# Patient Record
Sex: FEMALE | Race: BLACK OR AFRICAN AMERICAN | NOT HISPANIC OR LATINO
[De-identification: names, ages, dates, MRNs, and addresses within clinical notes are randomized per-mention and may not be internally consistent; named-entity substitution may affect disease eponyms.]

---

## 2019-12-03 ENCOUNTER — RESULT REVIEW (OUTPATIENT)
Age: 59
End: 2019-12-03

## 2023-10-25 ENCOUNTER — NON-APPOINTMENT (OUTPATIENT)
Age: 63
End: 2023-10-25

## 2023-10-31 ENCOUNTER — APPOINTMENT (OUTPATIENT)
Dept: THORACIC SURGERY | Facility: CLINIC | Age: 63
End: 2023-10-31
Payer: COMMERCIAL

## 2023-10-31 VITALS
BODY MASS INDEX: 33.82 KG/M2 | RESPIRATION RATE: 14 BRPM | OXYGEN SATURATION: 100 % | DIASTOLIC BLOOD PRESSURE: 79 MMHG | HEART RATE: 79 BPM | SYSTOLIC BLOOD PRESSURE: 153 MMHG | WEIGHT: 203 LBS | HEIGHT: 65 IN

## 2023-10-31 DIAGNOSIS — F17.200 NICOTINE DEPENDENCE, UNSPECIFIED, UNCOMPLICATED: ICD-10-CM

## 2023-10-31 DIAGNOSIS — Z86.39 PERSONAL HISTORY OF OTHER ENDOCRINE, NUTRITIONAL AND METABOLIC DISEASE: ICD-10-CM

## 2023-10-31 DIAGNOSIS — Z78.9 OTHER SPECIFIED HEALTH STATUS: ICD-10-CM

## 2023-10-31 DIAGNOSIS — Z80.7 FAMILY HISTORY OF OTHER MALIGNANT NEOPLASMS OF LYMPHOID, HEMATOPOIETIC AND RELATED TISSUES: ICD-10-CM

## 2023-10-31 DIAGNOSIS — Z20.1 CONTACT WITH AND (SUSPECTED) EXPOSURE TO TUBERCULOSIS: ICD-10-CM

## 2023-10-31 DIAGNOSIS — Z86.79 PERSONAL HISTORY OF OTHER DISEASES OF THE CIRCULATORY SYSTEM: ICD-10-CM

## 2023-10-31 DIAGNOSIS — D72.9 DISORDER OF WHITE BLOOD CELLS, UNSPECIFIED: ICD-10-CM

## 2023-10-31 DIAGNOSIS — Z83.511 FAMILY HISTORY OF GLAUCOMA: ICD-10-CM

## 2023-10-31 DIAGNOSIS — Z83.438 FAMILY HISTORY OF OTHER DISORDER OF LIPOPROTEIN METABOLISM AND OTHER LIPIDEMIA: ICD-10-CM

## 2023-10-31 DIAGNOSIS — Z82.49 FAMILY HISTORY OF ISCHEMIC HEART DISEASE AND OTHER DISEASES OF THE CIRCULATORY SYSTEM: ICD-10-CM

## 2023-10-31 PROCEDURE — 99205 OFFICE O/P NEW HI 60 MIN: CPT

## 2023-10-31 RX ORDER — ATORVASTATIN CALCIUM 20 MG/1
20 TABLET, FILM COATED ORAL
Refills: 0 | Status: ACTIVE | COMMUNITY

## 2023-10-31 RX ORDER — ASPIRIN 81 MG
81 TABLET, DELAYED RELEASE (ENTERIC COATED) ORAL
Refills: 0 | Status: ACTIVE | COMMUNITY

## 2023-10-31 RX ORDER — TRIAMTERENE AND HYDROCHLOROTHIAZIDE 37.5; 25 MG/1; MG/1
37.5-25 CAPSULE ORAL
Refills: 0 | Status: ACTIVE | COMMUNITY

## 2023-11-01 ENCOUNTER — NON-APPOINTMENT (OUTPATIENT)
Age: 63
End: 2023-11-01

## 2023-11-21 ENCOUNTER — OUTPATIENT (OUTPATIENT)
Dept: OUTPATIENT SERVICES | Facility: HOSPITAL | Age: 63
LOS: 1 days | End: 2023-11-21
Payer: COMMERCIAL

## 2023-11-21 ENCOUNTER — APPOINTMENT (OUTPATIENT)
Dept: PULMONOLOGY | Facility: CLINIC | Age: 63
End: 2023-11-21
Payer: COMMERCIAL

## 2023-11-21 VITALS
HEART RATE: 65 BPM | SYSTOLIC BLOOD PRESSURE: 146 MMHG | DIASTOLIC BLOOD PRESSURE: 83 MMHG | TEMPERATURE: 97 F | WEIGHT: 199.96 LBS | HEIGHT: 63.5 IN | OXYGEN SATURATION: 98 % | RESPIRATION RATE: 18 BRPM

## 2023-11-21 DIAGNOSIS — Z98.890 OTHER SPECIFIED POSTPROCEDURAL STATES: Chronic | ICD-10-CM

## 2023-11-21 DIAGNOSIS — R91.8 OTHER NONSPECIFIC ABNORMAL FINDING OF LUNG FIELD: ICD-10-CM

## 2023-11-21 DIAGNOSIS — I10 ESSENTIAL (PRIMARY) HYPERTENSION: ICD-10-CM

## 2023-11-21 DIAGNOSIS — Z91.89 OTHER SPECIFIED PERSONAL RISK FACTORS, NOT ELSEWHERE CLASSIFIED: ICD-10-CM

## 2023-11-21 DIAGNOSIS — Z98.891 HISTORY OF UTERINE SCAR FROM PREVIOUS SURGERY: Chronic | ICD-10-CM

## 2023-11-21 LAB
ALBUMIN SERPL ELPH-MCNC: 4.3 G/DL — SIGNIFICANT CHANGE UP (ref 3.3–5)
ALBUMIN SERPL ELPH-MCNC: 4.3 G/DL — SIGNIFICANT CHANGE UP (ref 3.3–5)
ALP SERPL-CCNC: 87 U/L — SIGNIFICANT CHANGE UP (ref 40–120)
ALP SERPL-CCNC: 87 U/L — SIGNIFICANT CHANGE UP (ref 40–120)
ALT FLD-CCNC: 8 U/L — SIGNIFICANT CHANGE UP (ref 4–33)
ALT FLD-CCNC: 8 U/L — SIGNIFICANT CHANGE UP (ref 4–33)
ANION GAP SERPL CALC-SCNC: 11 MMOL/L — SIGNIFICANT CHANGE UP (ref 7–14)
ANION GAP SERPL CALC-SCNC: 11 MMOL/L — SIGNIFICANT CHANGE UP (ref 7–14)
AST SERPL-CCNC: 7 U/L — SIGNIFICANT CHANGE UP (ref 4–32)
AST SERPL-CCNC: 7 U/L — SIGNIFICANT CHANGE UP (ref 4–32)
BILIRUB SERPL-MCNC: 0.4 MG/DL — SIGNIFICANT CHANGE UP (ref 0.2–1.2)
BILIRUB SERPL-MCNC: 0.4 MG/DL — SIGNIFICANT CHANGE UP (ref 0.2–1.2)
BLD GP AB SCN SERPL QL: NEGATIVE — SIGNIFICANT CHANGE UP
BLD GP AB SCN SERPL QL: NEGATIVE — SIGNIFICANT CHANGE UP
BUN SERPL-MCNC: 12 MG/DL — SIGNIFICANT CHANGE UP (ref 7–23)
BUN SERPL-MCNC: 12 MG/DL — SIGNIFICANT CHANGE UP (ref 7–23)
CALCIUM SERPL-MCNC: 9.6 MG/DL — SIGNIFICANT CHANGE UP (ref 8.4–10.5)
CALCIUM SERPL-MCNC: 9.6 MG/DL — SIGNIFICANT CHANGE UP (ref 8.4–10.5)
CHLORIDE SERPL-SCNC: 104 MMOL/L — SIGNIFICANT CHANGE UP (ref 98–107)
CHLORIDE SERPL-SCNC: 104 MMOL/L — SIGNIFICANT CHANGE UP (ref 98–107)
CO2 SERPL-SCNC: 29 MMOL/L — SIGNIFICANT CHANGE UP (ref 22–31)
CO2 SERPL-SCNC: 29 MMOL/L — SIGNIFICANT CHANGE UP (ref 22–31)
CREAT SERPL-MCNC: 0.84 MG/DL — SIGNIFICANT CHANGE UP (ref 0.5–1.3)
CREAT SERPL-MCNC: 0.84 MG/DL — SIGNIFICANT CHANGE UP (ref 0.5–1.3)
EGFR: 79 ML/MIN/1.73M2 — SIGNIFICANT CHANGE UP
EGFR: 79 ML/MIN/1.73M2 — SIGNIFICANT CHANGE UP
GLUCOSE SERPL-MCNC: 85 MG/DL — SIGNIFICANT CHANGE UP (ref 70–99)
GLUCOSE SERPL-MCNC: 85 MG/DL — SIGNIFICANT CHANGE UP (ref 70–99)
HCT VFR BLD CALC: 40.2 % — SIGNIFICANT CHANGE UP (ref 34.5–45)
HCT VFR BLD CALC: 40.2 % — SIGNIFICANT CHANGE UP (ref 34.5–45)
HGB BLD-MCNC: 13.5 G/DL — SIGNIFICANT CHANGE UP (ref 11.5–15.5)
HGB BLD-MCNC: 13.5 G/DL — SIGNIFICANT CHANGE UP (ref 11.5–15.5)
MCHC RBC-ENTMCNC: 30.3 PG — SIGNIFICANT CHANGE UP (ref 27–34)
MCHC RBC-ENTMCNC: 30.3 PG — SIGNIFICANT CHANGE UP (ref 27–34)
MCHC RBC-ENTMCNC: 33.6 GM/DL — SIGNIFICANT CHANGE UP (ref 32–36)
MCHC RBC-ENTMCNC: 33.6 GM/DL — SIGNIFICANT CHANGE UP (ref 32–36)
MCV RBC AUTO: 90.1 FL — SIGNIFICANT CHANGE UP (ref 80–100)
MCV RBC AUTO: 90.1 FL — SIGNIFICANT CHANGE UP (ref 80–100)
NRBC # BLD: 0 /100 WBCS — SIGNIFICANT CHANGE UP (ref 0–0)
NRBC # BLD: 0 /100 WBCS — SIGNIFICANT CHANGE UP (ref 0–0)
NRBC # FLD: 0 K/UL — SIGNIFICANT CHANGE UP (ref 0–0)
NRBC # FLD: 0 K/UL — SIGNIFICANT CHANGE UP (ref 0–0)
PLATELET # BLD AUTO: 257 K/UL — SIGNIFICANT CHANGE UP (ref 150–400)
PLATELET # BLD AUTO: 257 K/UL — SIGNIFICANT CHANGE UP (ref 150–400)
POTASSIUM SERPL-MCNC: 3.7 MMOL/L — SIGNIFICANT CHANGE UP (ref 3.5–5.3)
POTASSIUM SERPL-MCNC: 3.7 MMOL/L — SIGNIFICANT CHANGE UP (ref 3.5–5.3)
POTASSIUM SERPL-SCNC: 3.7 MMOL/L — SIGNIFICANT CHANGE UP (ref 3.5–5.3)
POTASSIUM SERPL-SCNC: 3.7 MMOL/L — SIGNIFICANT CHANGE UP (ref 3.5–5.3)
PROT SERPL-MCNC: 7 G/DL — SIGNIFICANT CHANGE UP (ref 6–8.3)
PROT SERPL-MCNC: 7 G/DL — SIGNIFICANT CHANGE UP (ref 6–8.3)
RBC # BLD: 4.46 M/UL — SIGNIFICANT CHANGE UP (ref 3.8–5.2)
RBC # BLD: 4.46 M/UL — SIGNIFICANT CHANGE UP (ref 3.8–5.2)
RBC # FLD: 12.6 % — SIGNIFICANT CHANGE UP (ref 10.3–14.5)
RBC # FLD: 12.6 % — SIGNIFICANT CHANGE UP (ref 10.3–14.5)
RH IG SCN BLD-IMP: POSITIVE — SIGNIFICANT CHANGE UP
SODIUM SERPL-SCNC: 144 MMOL/L — SIGNIFICANT CHANGE UP (ref 135–145)
SODIUM SERPL-SCNC: 144 MMOL/L — SIGNIFICANT CHANGE UP (ref 135–145)
WBC # BLD: 10.37 K/UL — SIGNIFICANT CHANGE UP (ref 3.8–10.5)
WBC # BLD: 10.37 K/UL — SIGNIFICANT CHANGE UP (ref 3.8–10.5)
WBC # FLD AUTO: 10.37 K/UL — SIGNIFICANT CHANGE UP (ref 3.8–10.5)
WBC # FLD AUTO: 10.37 K/UL — SIGNIFICANT CHANGE UP (ref 3.8–10.5)

## 2023-11-21 PROCEDURE — 93010 ELECTROCARDIOGRAM REPORT: CPT

## 2023-11-21 PROCEDURE — 94726 PLETHYSMOGRAPHY LUNG VOLUMES: CPT

## 2023-11-21 PROCEDURE — 94010 BREATHING CAPACITY TEST: CPT

## 2023-11-21 PROCEDURE — 94729 DIFFUSING CAPACITY: CPT

## 2023-11-21 RX ORDER — SODIUM CHLORIDE 9 MG/ML
1000 INJECTION, SOLUTION INTRAVENOUS
Refills: 0 | Status: DISCONTINUED | OUTPATIENT
Start: 2023-11-29 | End: 2023-11-30

## 2023-11-21 NOTE — H&P PST ADULT - HISTORY OF PRESENT ILLNESS
62 y/o F with H/O: HTN, HLD, Obesity, smokers x 40 years  eval by hematologist due to elevated WBC. S/P CT scan 2022 which showed a nodule in the left lung. F/U tearl, S/ P CT scan in 2023 showed that the nodule got bigger. Pt was then referred to surgeon for further consultation  62 y/o F with H/O: HTN, HLD, Obesity, smokers x 40 years presents for pre op evaluation stated that she was evaluated by hematologist due to elevated WBC. S/P CT scan 2022 which showed "a nodule in the left lung". F/U yearly, S/P CT scan in 2023 showed that the nodule got bigger. Pt was then referred to surgeon for further consultation. Now schedule for left video assisted thoracoscopy, robotic assisted, left lower lobe segmentectomy, possible lobectomy, mediastinal lymph node dissection tentatively on 11/29/23

## 2023-11-21 NOTE — H&P PST ADULT - NSICDXPASTSURGICALHX_GEN_ALL_CORE_FT
PAST SURGICAL HISTORY:  H/O ventral hernia repair     History of 2  sections     Status post colonoscopy

## 2023-11-21 NOTE — H&P PST ADULT - NSICDXPASTMEDICALHX_GEN_ALL_CORE_FT
PAST MEDICAL HISTORY:  HLD (hyperlipidemia)     HTN (hypertension)      PAST MEDICAL HISTORY:  HLD (hyperlipidemia)     HTN (hypertension)     Obesity     Other nonspecific abnormal finding of lung field

## 2023-11-21 NOTE — H&P PST ADULT - RESPIRATORY
clear to auscultation bilaterally/no wheezes/no rhonchi/no respiratory distress/no use of accessory muscles/airway patent/breath sounds equal/good air movement/respirations non-labored

## 2023-11-21 NOTE — H&P PST ADULT - NEGATIVE ENMT SYMPTOMS
no hearing difficulty/no vertigo/no sinus symptoms/no post-nasal discharge/no nose bleeds/no dry mouth/no throat pain

## 2023-11-21 NOTE — H&P PST ADULT - NEUROLOGICAL
cranial nerves II-XII intact/sensation intact/responds to pain negative cranial nerves II-XII intact/sensation intact/responds to pain/responds to verbal commands

## 2023-11-21 NOTE — H&P PST ADULT - PROBLEM SELECTOR PLAN 1
Schedule for left video assisted thoracoscopy, robotic assisted, left lower lobe segmentectomy, possible lobectomy, mediastinal lymph node dissection tentatively on 11/29/23. Pre op instructions, famotidine, chlorhexidine gluconate soap given and explained. Pt verbalized understanding.       Pending medical consultation as per surgeon's request

## 2023-11-28 ENCOUNTER — TRANSCRIPTION ENCOUNTER (OUTPATIENT)
Age: 63
End: 2023-11-28

## 2023-11-28 NOTE — ASU PATIENT PROFILE, ADULT - REASON FOR ADMISSION, PROFILE
left video assisted thoracoscopy, robotic assisted left lower lobe segmentectomy, possible lobectomy mediastinal lymph node disection

## 2023-11-28 NOTE — ASU PATIENT PROFILE, ADULT - FALL HARM RISK - UNIVERSAL INTERVENTIONS
Bed in lowest position, wheels locked, appropriate side rails in place/Call bell, personal items and telephone in reach/Instruct patient to call for assistance before getting out of bed or chair/Non-slip footwear when patient is out of bed/[place] to call system/Physically safe environment - no spills, clutter or unnecessary equipment/Purposeful Proactive Rounding/Room/bathroom lighting operational, light cord in reach

## 2023-11-28 NOTE — ASU PATIENT PROFILE, ADULT - NSICDXPASTMEDICALHX_GEN_ALL_CORE_FT
PAST MEDICAL HISTORY:  HLD (hyperlipidemia)     HTN (hypertension)     Obesity     Other nonspecific abnormal finding of lung field

## 2023-11-29 ENCOUNTER — RESULT REVIEW (OUTPATIENT)
Age: 63
End: 2023-11-29

## 2023-11-29 ENCOUNTER — INPATIENT (INPATIENT)
Facility: HOSPITAL | Age: 63
LOS: 1 days | Discharge: HOME | End: 2023-12-01
Attending: THORACIC SURGERY (CARDIOTHORACIC VASCULAR SURGERY) | Admitting: THORACIC SURGERY (CARDIOTHORACIC VASCULAR SURGERY)
Payer: COMMERCIAL

## 2023-11-29 ENCOUNTER — APPOINTMENT (OUTPATIENT)
Dept: THORACIC SURGERY | Facility: HOSPITAL | Age: 63
End: 2023-11-29

## 2023-11-29 VITALS
DIASTOLIC BLOOD PRESSURE: 89 MMHG | HEIGHT: 63.5 IN | TEMPERATURE: 97 F | HEART RATE: 64 BPM | WEIGHT: 199.96 LBS | SYSTOLIC BLOOD PRESSURE: 143 MMHG | RESPIRATION RATE: 16 BRPM | OXYGEN SATURATION: 99 %

## 2023-11-29 DIAGNOSIS — Z98.891 HISTORY OF UTERINE SCAR FROM PREVIOUS SURGERY: Chronic | ICD-10-CM

## 2023-11-29 DIAGNOSIS — Z98.890 OTHER SPECIFIED POSTPROCEDURAL STATES: Chronic | ICD-10-CM

## 2023-11-29 DIAGNOSIS — R91.8 OTHER NONSPECIFIC ABNORMAL FINDING OF LUNG FIELD: ICD-10-CM

## 2023-11-29 PROCEDURE — 88341 IMHCHEM/IMCYTCHM EA ADD ANTB: CPT | Mod: 26

## 2023-11-29 PROCEDURE — 32669 THORACOSCOPY REMOVE SEGMENT: CPT

## 2023-11-29 PROCEDURE — 32666 THORACOSCOPY W/WEDGE RESECT: CPT | Mod: 59,LT

## 2023-11-29 PROCEDURE — S2900 ROBOTIC SURGICAL SYSTEM: CPT | Mod: NC

## 2023-11-29 PROCEDURE — 88307 TISSUE EXAM BY PATHOLOGIST: CPT | Mod: 26

## 2023-11-29 PROCEDURE — 88305 TISSUE EXAM BY PATHOLOGIST: CPT | Mod: 26

## 2023-11-29 PROCEDURE — 88364 INSITU HYBRIDIZATION (FISH): CPT | Mod: 26

## 2023-11-29 PROCEDURE — 32674 THORACOSCOPY LYMPH NODE EXC: CPT | Mod: AS

## 2023-11-29 PROCEDURE — 32669 THORACOSCOPY REMOVE SEGMENT: CPT | Mod: AS

## 2023-11-29 PROCEDURE — 32674 THORACOSCOPY LYMPH NODE EXC: CPT

## 2023-11-29 PROCEDURE — 88313 SPECIAL STAINS GROUP 2: CPT | Mod: 26

## 2023-11-29 PROCEDURE — 88365 INSITU HYBRIDIZATION (FISH): CPT | Mod: 26,59

## 2023-11-29 PROCEDURE — 88342 IMHCHEM/IMCYTCHM 1ST ANTB: CPT | Mod: 26

## 2023-11-29 PROCEDURE — 32666 THORACOSCOPY W/WEDGE RESECT: CPT | Mod: AS,59,LT

## 2023-11-29 PROCEDURE — 71045 X-RAY EXAM CHEST 1 VIEW: CPT | Mod: 26

## 2023-11-29 PROCEDURE — 88309 TISSUE EXAM BY PATHOLOGIST: CPT | Mod: 26

## 2023-11-29 PROCEDURE — 31645 BRNCHSC W/THER ASPIR 1ST: CPT

## 2023-11-29 DEVICE — STAPLER COVIDIEN TRI-STAPLE 60MM PURPLE RELOAD: Type: IMPLANTABLE DEVICE | Status: FUNCTIONAL

## 2023-11-29 DEVICE — STAPLER COVIDIEN TRI-STAPLE 30MM PURPLE RELOAD: Type: IMPLANTABLE DEVICE | Status: FUNCTIONAL

## 2023-11-29 DEVICE — STAPLER COVIDIEN TRI-STAPLE CURVED 60MM PURPLE RELOAD: Type: IMPLANTABLE DEVICE | Status: FUNCTIONAL

## 2023-11-29 DEVICE — STAPLER COVIDIEN TRI-STAPLE CURVED 45MM PURPLE RELOAD: Type: IMPLANTABLE DEVICE | Status: FUNCTIONAL

## 2023-11-29 DEVICE — SURGICEL 4 X 8": Type: IMPLANTABLE DEVICE | Status: FUNCTIONAL

## 2023-11-29 DEVICE — CHEST DRAIN THORACIC ARGYLE PVC 28FR STRAIGHT: Type: IMPLANTABLE DEVICE | Status: FUNCTIONAL

## 2023-11-29 DEVICE — STAPLER COVIDIEN TRI-STAPLE 45MM PURPLE RELOAD: Type: IMPLANTABLE DEVICE | Status: FUNCTIONAL

## 2023-11-29 DEVICE — STAPLER COVIDIEN TRI-STAPLE CURVED 30MM TAN RELOAD: Type: IMPLANTABLE DEVICE | Status: FUNCTIONAL

## 2023-11-29 RX ORDER — HEPARIN SODIUM 5000 [USP'U]/ML
5000 INJECTION INTRAVENOUS; SUBCUTANEOUS ONCE
Refills: 0 | Status: COMPLETED | OUTPATIENT
Start: 2023-11-29 | End: 2023-11-29

## 2023-11-29 RX ORDER — ASPIRIN/CALCIUM CARB/MAGNESIUM 324 MG
1 TABLET ORAL
Refills: 0 | DISCHARGE

## 2023-11-29 RX ORDER — DORNASE ALFA 1 MG/ML
2.5 SOLUTION RESPIRATORY (INHALATION) EVERY 12 HOURS
Refills: 0 | Status: DISCONTINUED | OUTPATIENT
Start: 2023-11-29 | End: 2023-12-01

## 2023-11-29 RX ORDER — FENTANYL CITRATE 50 UG/ML
25 INJECTION INTRAVENOUS
Refills: 0 | Status: DISCONTINUED | OUTPATIENT
Start: 2023-11-29 | End: 2023-11-29

## 2023-11-29 RX ORDER — SODIUM CHLORIDE 9 MG/ML
4 INJECTION INTRAMUSCULAR; INTRAVENOUS; SUBCUTANEOUS EVERY 12 HOURS
Refills: 0 | Status: DISCONTINUED | OUTPATIENT
Start: 2023-11-29 | End: 2023-12-01

## 2023-11-29 RX ORDER — ONDANSETRON 8 MG/1
4 TABLET, FILM COATED ORAL EVERY 6 HOURS
Refills: 0 | Status: DISCONTINUED | OUTPATIENT
Start: 2023-11-29 | End: 2023-12-01

## 2023-11-29 RX ORDER — TRIAMTERENE/HYDROCHLOROTHIAZID 75 MG-50MG
1 TABLET ORAL
Refills: 0 | DISCHARGE

## 2023-11-29 RX ORDER — NALOXONE HYDROCHLORIDE 4 MG/.1ML
0.1 SPRAY NASAL
Refills: 0 | Status: DISCONTINUED | OUTPATIENT
Start: 2023-11-29 | End: 2023-12-01

## 2023-11-29 RX ORDER — ACETAMINOPHEN 500 MG
975 TABLET ORAL ONCE
Refills: 0 | Status: COMPLETED | OUTPATIENT
Start: 2023-11-29 | End: 2023-11-29

## 2023-11-29 RX ORDER — HYDROMORPHONE HYDROCHLORIDE 2 MG/ML
0.5 INJECTION INTRAMUSCULAR; INTRAVENOUS; SUBCUTANEOUS
Refills: 0 | Status: DISCONTINUED | OUTPATIENT
Start: 2023-11-29 | End: 2023-11-29

## 2023-11-29 RX ORDER — POLYETHYLENE GLYCOL 3350 17 G/17G
17 POWDER, FOR SOLUTION ORAL DAILY
Refills: 0 | Status: DISCONTINUED | OUTPATIENT
Start: 2023-11-29 | End: 2023-12-01

## 2023-11-29 RX ORDER — ATORVASTATIN CALCIUM 80 MG/1
1 TABLET, FILM COATED ORAL
Refills: 0 | DISCHARGE

## 2023-11-29 RX ORDER — PETROLATUM,WHITE
1 JELLY (GRAM) TOPICAL
Refills: 0 | Status: DISCONTINUED | OUTPATIENT
Start: 2023-11-29 | End: 2023-12-01

## 2023-11-29 RX ORDER — HEPARIN SODIUM 5000 [USP'U]/ML
5000 INJECTION INTRAVENOUS; SUBCUTANEOUS EVERY 8 HOURS
Refills: 0 | Status: DISCONTINUED | OUTPATIENT
Start: 2023-11-29 | End: 2023-12-01

## 2023-11-29 RX ORDER — INFLUENZA VIRUS VACCINE 15; 15; 15; 15 UG/.5ML; UG/.5ML; UG/.5ML; UG/.5ML
0.5 SUSPENSION INTRAMUSCULAR ONCE
Refills: 0 | Status: DISCONTINUED | OUTPATIENT
Start: 2023-11-29 | End: 2023-11-30

## 2023-11-29 RX ORDER — HYDROMORPHONE HYDROCHLORIDE 2 MG/ML
30 INJECTION INTRAMUSCULAR; INTRAVENOUS; SUBCUTANEOUS
Refills: 0 | Status: DISCONTINUED | OUTPATIENT
Start: 2023-11-29 | End: 2023-11-30

## 2023-11-29 RX ORDER — ATORVASTATIN CALCIUM 80 MG/1
20 TABLET, FILM COATED ORAL AT BEDTIME
Refills: 0 | Status: DISCONTINUED | OUTPATIENT
Start: 2023-11-29 | End: 2023-12-01

## 2023-11-29 RX ORDER — IPRATROPIUM/ALBUTEROL SULFATE 18-103MCG
3 AEROSOL WITH ADAPTER (GRAM) INHALATION EVERY 6 HOURS
Refills: 0 | Status: DISCONTINUED | OUTPATIENT
Start: 2023-11-29 | End: 2023-12-01

## 2023-11-29 RX ORDER — SENNA PLUS 8.6 MG/1
2 TABLET ORAL AT BEDTIME
Refills: 0 | Status: DISCONTINUED | OUTPATIENT
Start: 2023-11-29 | End: 2023-12-01

## 2023-11-29 RX ORDER — HYDROMORPHONE HYDROCHLORIDE 2 MG/ML
0.5 INJECTION INTRAMUSCULAR; INTRAVENOUS; SUBCUTANEOUS
Refills: 0 | Status: DISCONTINUED | OUTPATIENT
Start: 2023-11-29 | End: 2023-11-30

## 2023-11-29 RX ORDER — GABAPENTIN 400 MG/1
300 CAPSULE ORAL ONCE
Refills: 0 | Status: COMPLETED | OUTPATIENT
Start: 2023-11-29 | End: 2023-11-29

## 2023-11-29 RX ADMIN — HEPARIN SODIUM 5000 UNIT(S): 5000 INJECTION INTRAVENOUS; SUBCUTANEOUS at 23:17

## 2023-11-29 RX ADMIN — POLYETHYLENE GLYCOL 3350 17 GRAM(S): 17 POWDER, FOR SOLUTION ORAL at 14:55

## 2023-11-29 RX ADMIN — HYDROMORPHONE HYDROCHLORIDE 30 MILLILITER(S): 2 INJECTION INTRAMUSCULAR; INTRAVENOUS; SUBCUTANEOUS at 14:17

## 2023-11-29 RX ADMIN — HYDROMORPHONE HYDROCHLORIDE 30 MILLILITER(S): 2 INJECTION INTRAMUSCULAR; INTRAVENOUS; SUBCUTANEOUS at 19:17

## 2023-11-29 RX ADMIN — GABAPENTIN 300 MILLIGRAM(S): 400 CAPSULE ORAL at 07:03

## 2023-11-29 RX ADMIN — HEPARIN SODIUM 5000 UNIT(S): 5000 INJECTION INTRAVENOUS; SUBCUTANEOUS at 14:55

## 2023-11-29 RX ADMIN — Medication 3 MILLILITER(S): at 22:44

## 2023-11-29 RX ADMIN — HEPARIN SODIUM 5000 UNIT(S): 5000 INJECTION INTRAVENOUS; SUBCUTANEOUS at 07:04

## 2023-11-29 RX ADMIN — HYDROMORPHONE HYDROCHLORIDE 30 MILLILITER(S): 2 INJECTION INTRAMUSCULAR; INTRAVENOUS; SUBCUTANEOUS at 13:37

## 2023-11-29 RX ADMIN — HYDROMORPHONE HYDROCHLORIDE 30 MILLILITER(S): 2 INJECTION INTRAMUSCULAR; INTRAVENOUS; SUBCUTANEOUS at 10:57

## 2023-11-29 RX ADMIN — Medication 3 MILLILITER(S): at 15:18

## 2023-11-29 RX ADMIN — Medication 975 MILLIGRAM(S): at 07:03

## 2023-11-29 RX ADMIN — ATORVASTATIN CALCIUM 20 MILLIGRAM(S): 80 TABLET, FILM COATED ORAL at 21:44

## 2023-11-29 RX ADMIN — SENNA PLUS 2 TABLET(S): 8.6 TABLET ORAL at 21:44

## 2023-11-29 NOTE — BRIEF OPERATIVE NOTE - NSICDXBRIEFPROCEDURE_GEN_ALL_CORE_FT
PROCEDURES:  Bronchoscopy, flexible, adult 29-Nov-2023 10:37:28  Monica Lawson  Robotic assisted procedure, thoracoscopic 29-Nov-2023 10:37:37  Monica Lawson  Segmentectomy of lung 29-Nov-2023 10:37:46  Monica Lawson  Wedge resection, lung 29-Nov-2023 10:37:57  Monica Lawson  Robot-assisted mediastinal lymphadenectomy 29-Nov-2023 10:38:08  Monica Lawson

## 2023-11-29 NOTE — PATIENT PROFILE ADULT - FALL HARM RISK - HARM RISK INTERVENTIONS
Assistance with ambulation/Assistance OOB with selected safe patient handling equipment/Communicate Risk of Fall with Harm to all staff/Discuss with provider need for PT consult/Monitor gait and stability/Reinforce activity limits and safety measures with patient and family/Sit up slowly, dangle for a short time, stand at bedside before walking/Tailored Fall Risk Interventions/Use of alarms - bed, chair and/or voice tab/Visual Cue: Yellow wristband and red socks/Bed in lowest position, wheels locked, appropriate side rails in place/Call bell, personal items and telephone in reach/Instruct patient to call for assistance before getting out of bed or chair/Non-slip footwear when patient is out of bed/[place] to call system/Physically safe environment - no spills, clutter or unnecessary equipment/Purposeful Proactive Rounding/Room/bathroom lighting operational, light cord in reach

## 2023-11-29 NOTE — BRIEF OPERATIVE NOTE - NSICDXBRIEFPREOP_GEN_ALL_CORE_FT
PRE-OP DIAGNOSIS:  Other nonspecific abnormal finding of lung field 29-Nov-2023 10:38:25  Monica Lawson

## 2023-11-29 NOTE — BRIEF OPERATIVE NOTE - OPERATION/FINDINGS
Flexible bronchoscopy, noted to have significant amount oral secretions in airway. Robotic assisted left vats left lower lobe superior segmentectomy, MLND of levels 5,7,8,9,11,12. Left lower lobe wedge resection.

## 2023-11-29 NOTE — ASU PREOP CHECKLIST - AS BP NONINV SITE
Anesthesia Pre Eval Note    Anesthesia ROS/Med Hx    Overall Review:  EKG was reviewed     Anesthetic Complication History:  Patient does not have a history of anesthetic complications      Pulmonary Review:  Comments: Tracheal stenosis  Lung nodule  Positive for sleep apnea - CPAP    Neuro/Psych Review:  Patient does not have a neuro/psych history       Cardiovascular Review:  Exercise tolerance: good (>4 METS)  Positive for hypertension  Positive for hyperlipidemia    GI/HEPATIC/RENAL Review:   Positive for liver disease (Fatty liver)   Positive for bowel prep    End/Other Review:  Positive for diabetes - type 2  Positive for obesity class I - 30.00 - 34.99  Additional Results:  EKG:  Encounter Date: 22  -ELECTROCARDIOGRAM 12-LEAD:                                                                 Narrative    Test was performed.    :1958  AGE:64 year old    Ordering provider: Dr. Sukhjinder De Souza    Diagnosis: Preoperative evaluation to rule out surgical contraindication (Z01.818); Type 2 diabetes mellitus with microalbuminuria, without long-term current use of insulin (CMS/[facility]) (E11.29, R80.9)                                                                 Impression    Normal sinus rhythm with a rate of 75 beats per minute.    ALLERGIES:   -- Ace Inhibitors -- Cough   -- Erythromycin Base -- GI UPSET   -- Penicillins -- GI UPSET   -- Piperacillin Sod-Tazobactam So -- DIARRHEA   -- Sulfa Antibiotics -- RASH    --  Penile sores.       Last Labs        Component                Value               Date/Time                  WBC                      12.0 (H)            10/26/2022 1614            RBC                      5.20                10/26/2022 1614            HGB                      16.1                10/26/2022 1614            HCT                      46.0                10/26/2022 1614            MCV                      88.5                10/26/2022 1614            MCH                      31.0                 10/26/2022 1614            MCHC                     35.0                10/26/2022 1614            RDW-CV                   13.0                10/26/2022 1614            Sodium                   141                 10/26/2022 1614            Potassium                4.2                 10/26/2022 1614            Chloride                 101                 10/26/2022 1614            Carbon Dioxide           32                  10/26/2022 1614            Glucose                  122 (H)             10/26/2022 1614            BUN                      16                  10/26/2022 1614            Creatinine               0.87                10/26/2022 1614            Glomerular Filtrati*     >90                 10/26/2022 1614            Calcium                  9.5                 10/26/2022 1614            PLT                      381                 10/26/2022 1614            INR                      0.9                 11/01/2022 0747        Prior to Admission medications :  Medication electrolyte/PEG 3350 (Nulytely with Flavor Packs) 420 g solution, Sig Take as directed per Colonoscopy prep letter instructions, Start Date 9/21/22, End Date , Taking? Yes, Authorizing Provider Bo Bruno MD    Medication losartan-hydrochlorothiazide (Hyzaar) 50-12.5 MG per tablet, Sig Take 1 tablet by mouth daily., Start Date 12/9/21, End Date , Taking? Yes, Authorizing Provider Mynor De Souza MD    Medication simvastatin (ZOCOR) 80 MG tablet, Sig Take 0.5 tablets by mouth daily.  Patient taking differently: Take 40 mg by mouth every evening., Start Date 12/9/21, End Date , Taking? Yes, Authorizing Provider Mynor De Souza MD    Medication CINNAMON PO, Sig Take 1 tablet by mouth daily. , Start Date , End Date , Taking? Yes, Authorizing Provider Outside Provider    Medication Cetirizine HCl (ZYRTEC PO), Sig Take 1 tablet by mouth 2 times daily. , Start Date , End Date , Taking? Yes, Authorizing Provider Outside  Provider    Medication Probiotic Product (PROBIOTIC PO), Sig Take 1 tablet by mouth daily. , Start Date , End Date , Taking? Yes, Authorizing Provider Outside Provider    Medication Multiple Vitamins-Minerals (MULTI FOR HIM 50+) Tab, Sig 1 po daily  Patient taking differently: Take 1 tablet by mouth daily. 1 po daily, Start Date 1/23/19, End Date , Taking? Yes, Authorizing Provider Aliyah Lloyd MD    Medication GLUCOSAMINE CHONDRO COMPLEX 500-400 MG PO TABS, Sig 750mg , 2 pe rdya   Patient taking differently: Take 1 tablet by mouth 2 times daily., Start Date 8/31/06, End Date , Taking? Yes, Authorizing Provider Arnulfo Dailey MD    Medication fluticasone (FLONASE) 50 MCG/ACT nasal spray, Sig Spray 2 sprays in each nostril in the morning and 2 sprays in the evening.  Patient taking differently: Spray 1 spray in each nostril 2 times daily., Start Date 9/30/22, End Date , Taking? , Authorizing Provider Mynor De Souza MD    Medication metformin (GLUCOPHAGE) 1000 MG tablet, Sig Take 1 tablet by mouth daily (with breakfast)., Start Date 12/9/21, End Date , Taking? , Authorizing Provider Mynor De Souza MD            Relevant Problems   Anesthesia Problems   (+) Sleep apnea, AHI 24, min sat 70%, CPAP auto 5-15, median 7.7 2020       Physical Exam     Airway   Mallampati: III  TM Distance: >3 FB  Neck ROM: Full  Neck: Able to place in sniff position  TMJ Mobility: Good    Cardiovascular  Cardiovascular exam normal  Cardio Rhythm: Regular  Cardio Rate: Normal    Head Assessment  Head assessment: Normocephalic and Atraumatic    General Assessment  General Assessment: Alert and oriented and No acute distress    Dental Exam  Dental exam normal    Pulmonary Exam  Pulmonary exam normal  Breath sounds clear to auscultation:  Yes    Abdominal Exam  Abdominal exam normal  Patient Demonstrates:  Obese      Anesthesia Plan:    ASA Status: 3  Anesthesia Type: MAC    Induction: Intravenous  Preferred Airway Type: Mask  Maintenance:  TIVA  Premedication: None      Post-op Pain Management: Per Surgeon      Checklist  Reviewed: Lab Results, Allergies, Past Med History, Medications, Problem list and NPO Status  Consent/Risks Discussed Statement:  The proposed anesthetic plan, including its risks and benefits, have been discussed with the Patient along with the risks and benefits of alternatives. Questions were encouraged and answered and the patient and/or representative understands and agrees to proceed.    I have discussed elements of the patient's history or examination, as noted above and/or as follows, that place the patient at higher risk of complications; BMI> 30 (obesity), sleep apnea and pulmonary disease.    I discussed with the patient (and/or patient's legal representative) the risks and benefits of the proposed anesthesia plan, MAC, which may include services performed by other anesthesia providers.    Alternative anesthesia plans, if available, were reviewed with the patient (and/or patient's legal representative). Discussion has been held with the patient (and/or patient's legal representative) regarding risks of anesthesia, which include Nausea, Vomiting, Sore Throat, Dental Injury, Intra-operative Awareness, aspiration and depressed breathing and emergent situations that may require change in anesthesia plan.    The patient (and/or patient's legal representative) has indicated understanding, his/her questions have been answered, and he/she wishes to proceed with the planned anesthetic.    Blood Products: Not Anticipated     left upper arm

## 2023-11-29 NOTE — BRIEF OPERATIVE NOTE - NSICDXBRIEFPOSTOP_GEN_ALL_CORE_FT
Patient is unable to tolerate the MRI.      Lexus Bateman, RN  10/13/20 3248 POST-OP DIAGNOSIS:  Other nonspecific abnormal finding of lung field 29-Nov-2023 10:38:13  Monica Lawson

## 2023-11-29 NOTE — PATIENT PROFILE ADULT - NSPROMEDSADMININFO_GEN_A_NUR
no concerns Consent (Temporal Branch)/Introductory Paragraph: The rationale for Mohs was explained to the patient and consent was obtained. The risks, benefits and alternatives to therapy were discussed in detail. Specifically, the risks of damage to the temporal branch of the facial nerve, infection, scarring, bleeding, prolonged wound healing, incomplete removal, allergy to anesthesia, and recurrence were addressed. Prior to the procedure, the treatment site was clearly identified and confirmed by the patient. All components of Universal Protocol/PAUSE Rule completed.

## 2023-11-29 NOTE — BRIEF OPERATIVE NOTE - COMMENTS
I, JIA Argueta served as first assistant on this operation. My involvement was including but not limited to positioning, prepping and draping, robotic port placement, robot docking, robotic instrument insertion and removal, exposure for the surgeon by using instruments, retrieving specimens from the operative field, closing surgical incisions, undocking the robot and dressing wounds. As well as other tasks as directed by the surgeon.

## 2023-11-30 ENCOUNTER — TRANSCRIPTION ENCOUNTER (OUTPATIENT)
Age: 63
End: 2023-11-30

## 2023-11-30 LAB
ANION GAP SERPL CALC-SCNC: 15 MMOL/L — HIGH (ref 7–14)
ANION GAP SERPL CALC-SCNC: 15 MMOL/L — HIGH (ref 7–14)
BASOPHILS # BLD AUTO: 0.03 K/UL — SIGNIFICANT CHANGE UP (ref 0–0.2)
BASOPHILS # BLD AUTO: 0.03 K/UL — SIGNIFICANT CHANGE UP (ref 0–0.2)
BASOPHILS NFR BLD AUTO: 0.2 % — SIGNIFICANT CHANGE UP (ref 0–2)
BASOPHILS NFR BLD AUTO: 0.2 % — SIGNIFICANT CHANGE UP (ref 0–2)
BUN SERPL-MCNC: 16 MG/DL — SIGNIFICANT CHANGE UP (ref 7–23)
BUN SERPL-MCNC: 16 MG/DL — SIGNIFICANT CHANGE UP (ref 7–23)
CALCIUM SERPL-MCNC: 9.4 MG/DL — SIGNIFICANT CHANGE UP (ref 8.4–10.5)
CALCIUM SERPL-MCNC: 9.4 MG/DL — SIGNIFICANT CHANGE UP (ref 8.4–10.5)
CHLORIDE SERPL-SCNC: 102 MMOL/L — SIGNIFICANT CHANGE UP (ref 98–107)
CHLORIDE SERPL-SCNC: 102 MMOL/L — SIGNIFICANT CHANGE UP (ref 98–107)
CO2 SERPL-SCNC: 25 MMOL/L — SIGNIFICANT CHANGE UP (ref 22–31)
CO2 SERPL-SCNC: 25 MMOL/L — SIGNIFICANT CHANGE UP (ref 22–31)
CREAT SERPL-MCNC: 0.85 MG/DL — SIGNIFICANT CHANGE UP (ref 0.5–1.3)
CREAT SERPL-MCNC: 0.85 MG/DL — SIGNIFICANT CHANGE UP (ref 0.5–1.3)
EGFR: 77 ML/MIN/1.73M2 — SIGNIFICANT CHANGE UP
EGFR: 77 ML/MIN/1.73M2 — SIGNIFICANT CHANGE UP
EOSINOPHIL # BLD AUTO: 0 K/UL — SIGNIFICANT CHANGE UP (ref 0–0.5)
EOSINOPHIL # BLD AUTO: 0 K/UL — SIGNIFICANT CHANGE UP (ref 0–0.5)
EOSINOPHIL NFR BLD AUTO: 0 % — SIGNIFICANT CHANGE UP (ref 0–6)
EOSINOPHIL NFR BLD AUTO: 0 % — SIGNIFICANT CHANGE UP (ref 0–6)
GLUCOSE SERPL-MCNC: 88 MG/DL — SIGNIFICANT CHANGE UP (ref 70–99)
GLUCOSE SERPL-MCNC: 88 MG/DL — SIGNIFICANT CHANGE UP (ref 70–99)
HCT VFR BLD CALC: 38.8 % — SIGNIFICANT CHANGE UP (ref 34.5–45)
HCT VFR BLD CALC: 38.8 % — SIGNIFICANT CHANGE UP (ref 34.5–45)
HGB BLD-MCNC: 13 G/DL — SIGNIFICANT CHANGE UP (ref 11.5–15.5)
HGB BLD-MCNC: 13 G/DL — SIGNIFICANT CHANGE UP (ref 11.5–15.5)
IANC: 10.61 K/UL — HIGH (ref 1.8–7.4)
IANC: 10.61 K/UL — HIGH (ref 1.8–7.4)
IMM GRANULOCYTES NFR BLD AUTO: 0.5 % — SIGNIFICANT CHANGE UP (ref 0–0.9)
IMM GRANULOCYTES NFR BLD AUTO: 0.5 % — SIGNIFICANT CHANGE UP (ref 0–0.9)
LYMPHOCYTES # BLD AUTO: 16.4 % — SIGNIFICANT CHANGE UP (ref 13–44)
LYMPHOCYTES # BLD AUTO: 16.4 % — SIGNIFICANT CHANGE UP (ref 13–44)
LYMPHOCYTES # BLD AUTO: 2.28 K/UL — SIGNIFICANT CHANGE UP (ref 1–3.3)
LYMPHOCYTES # BLD AUTO: 2.28 K/UL — SIGNIFICANT CHANGE UP (ref 1–3.3)
MAGNESIUM SERPL-MCNC: 2.2 MG/DL — SIGNIFICANT CHANGE UP (ref 1.6–2.6)
MAGNESIUM SERPL-MCNC: 2.2 MG/DL — SIGNIFICANT CHANGE UP (ref 1.6–2.6)
MCHC RBC-ENTMCNC: 30.6 PG — SIGNIFICANT CHANGE UP (ref 27–34)
MCHC RBC-ENTMCNC: 30.6 PG — SIGNIFICANT CHANGE UP (ref 27–34)
MCHC RBC-ENTMCNC: 33.5 GM/DL — SIGNIFICANT CHANGE UP (ref 32–36)
MCHC RBC-ENTMCNC: 33.5 GM/DL — SIGNIFICANT CHANGE UP (ref 32–36)
MCV RBC AUTO: 91.3 FL — SIGNIFICANT CHANGE UP (ref 80–100)
MCV RBC AUTO: 91.3 FL — SIGNIFICANT CHANGE UP (ref 80–100)
MONOCYTES # BLD AUTO: 0.95 K/UL — HIGH (ref 0–0.9)
MONOCYTES # BLD AUTO: 0.95 K/UL — HIGH (ref 0–0.9)
MONOCYTES NFR BLD AUTO: 6.8 % — SIGNIFICANT CHANGE UP (ref 2–14)
MONOCYTES NFR BLD AUTO: 6.8 % — SIGNIFICANT CHANGE UP (ref 2–14)
NEUTROPHILS # BLD AUTO: 10.61 K/UL — HIGH (ref 1.8–7.4)
NEUTROPHILS # BLD AUTO: 10.61 K/UL — HIGH (ref 1.8–7.4)
NEUTROPHILS NFR BLD AUTO: 76.1 % — SIGNIFICANT CHANGE UP (ref 43–77)
NEUTROPHILS NFR BLD AUTO: 76.1 % — SIGNIFICANT CHANGE UP (ref 43–77)
NRBC # BLD: 0 /100 WBCS — SIGNIFICANT CHANGE UP (ref 0–0)
NRBC # BLD: 0 /100 WBCS — SIGNIFICANT CHANGE UP (ref 0–0)
NRBC # FLD: 0 K/UL — SIGNIFICANT CHANGE UP (ref 0–0)
NRBC # FLD: 0 K/UL — SIGNIFICANT CHANGE UP (ref 0–0)
PHOSPHATE SERPL-MCNC: 2.4 MG/DL — LOW (ref 2.5–4.5)
PHOSPHATE SERPL-MCNC: 2.4 MG/DL — LOW (ref 2.5–4.5)
PLATELET # BLD AUTO: 259 K/UL — SIGNIFICANT CHANGE UP (ref 150–400)
PLATELET # BLD AUTO: 259 K/UL — SIGNIFICANT CHANGE UP (ref 150–400)
POTASSIUM SERPL-MCNC: 3.3 MMOL/L — LOW (ref 3.5–5.3)
POTASSIUM SERPL-MCNC: 3.3 MMOL/L — LOW (ref 3.5–5.3)
POTASSIUM SERPL-SCNC: 3.3 MMOL/L — LOW (ref 3.5–5.3)
POTASSIUM SERPL-SCNC: 3.3 MMOL/L — LOW (ref 3.5–5.3)
RBC # BLD: 4.25 M/UL — SIGNIFICANT CHANGE UP (ref 3.8–5.2)
RBC # BLD: 4.25 M/UL — SIGNIFICANT CHANGE UP (ref 3.8–5.2)
RBC # FLD: 12.8 % — SIGNIFICANT CHANGE UP (ref 10.3–14.5)
RBC # FLD: 12.8 % — SIGNIFICANT CHANGE UP (ref 10.3–14.5)
SODIUM SERPL-SCNC: 142 MMOL/L — SIGNIFICANT CHANGE UP (ref 135–145)
SODIUM SERPL-SCNC: 142 MMOL/L — SIGNIFICANT CHANGE UP (ref 135–145)
WBC # BLD: 13.94 K/UL — HIGH (ref 3.8–10.5)
WBC # BLD: 13.94 K/UL — HIGH (ref 3.8–10.5)
WBC # FLD AUTO: 13.94 K/UL — HIGH (ref 3.8–10.5)
WBC # FLD AUTO: 13.94 K/UL — HIGH (ref 3.8–10.5)

## 2023-11-30 PROCEDURE — 71045 X-RAY EXAM CHEST 1 VIEW: CPT | Mod: 26,76

## 2023-11-30 RX ORDER — ASPIRIN/CALCIUM CARB/MAGNESIUM 324 MG
81 TABLET ORAL DAILY
Refills: 0 | Status: DISCONTINUED | OUTPATIENT
Start: 2023-11-30 | End: 2023-12-01

## 2023-11-30 RX ORDER — LIDOCAINE 4 G/100G
1 CREAM TOPICAL EVERY 24 HOURS
Refills: 0 | Status: DISCONTINUED | OUTPATIENT
Start: 2023-11-30 | End: 2023-12-01

## 2023-11-30 RX ORDER — POTASSIUM CHLORIDE 20 MEQ
40 PACKET (EA) ORAL ONCE
Refills: 0 | Status: COMPLETED | OUTPATIENT
Start: 2023-11-30 | End: 2023-11-30

## 2023-11-30 RX ORDER — OXYCODONE HYDROCHLORIDE 5 MG/1
2.5 TABLET ORAL
Refills: 0 | Status: DISCONTINUED | OUTPATIENT
Start: 2023-11-30 | End: 2023-12-01

## 2023-11-30 RX ORDER — OXYCODONE HYDROCHLORIDE 5 MG/1
5 TABLET ORAL
Refills: 0 | Status: DISCONTINUED | OUTPATIENT
Start: 2023-11-30 | End: 2023-12-01

## 2023-11-30 RX ORDER — ACETAMINOPHEN 500 MG
650 TABLET ORAL EVERY 6 HOURS
Refills: 0 | Status: DISCONTINUED | OUTPATIENT
Start: 2023-11-30 | End: 2023-12-01

## 2023-11-30 RX ADMIN — Medication 3 MILLILITER(S): at 10:21

## 2023-11-30 RX ADMIN — HEPARIN SODIUM 5000 UNIT(S): 5000 INJECTION INTRAVENOUS; SUBCUTANEOUS at 23:07

## 2023-11-30 RX ADMIN — SENNA PLUS 2 TABLET(S): 8.6 TABLET ORAL at 23:07

## 2023-11-30 RX ADMIN — Medication 650 MILLIGRAM(S): at 12:33

## 2023-11-30 RX ADMIN — Medication 650 MILLIGRAM(S): at 23:07

## 2023-11-30 RX ADMIN — HYDROMORPHONE HYDROCHLORIDE 30 MILLILITER(S): 2 INJECTION INTRAMUSCULAR; INTRAVENOUS; SUBCUTANEOUS at 07:15

## 2023-11-30 RX ADMIN — ATORVASTATIN CALCIUM 20 MILLIGRAM(S): 80 TABLET, FILM COATED ORAL at 23:07

## 2023-11-30 RX ADMIN — SODIUM CHLORIDE 4 MILLILITER(S): 9 INJECTION INTRAMUSCULAR; INTRAVENOUS; SUBCUTANEOUS at 23:12

## 2023-11-30 RX ADMIN — DORNASE ALFA 2.5 MILLIGRAM(S): 1 SOLUTION RESPIRATORY (INHALATION) at 10:23

## 2023-11-30 RX ADMIN — LIDOCAINE 1 PATCH: 4 CREAM TOPICAL at 12:34

## 2023-11-30 RX ADMIN — Medication 3 MILLILITER(S): at 03:28

## 2023-11-30 RX ADMIN — Medication 3 MILLILITER(S): at 23:11

## 2023-11-30 RX ADMIN — Medication 650 MILLIGRAM(S): at 13:03

## 2023-11-30 RX ADMIN — SODIUM CHLORIDE 4 MILLILITER(S): 9 INJECTION INTRAMUSCULAR; INTRAVENOUS; SUBCUTANEOUS at 00:34

## 2023-11-30 RX ADMIN — Medication 3 MILLILITER(S): at 16:38

## 2023-11-30 RX ADMIN — HEPARIN SODIUM 5000 UNIT(S): 5000 INJECTION INTRAVENOUS; SUBCUTANEOUS at 06:07

## 2023-11-30 RX ADMIN — LIDOCAINE 1 PATCH: 4 CREAM TOPICAL at 19:00

## 2023-11-30 RX ADMIN — DORNASE ALFA 2.5 MILLIGRAM(S): 1 SOLUTION RESPIRATORY (INHALATION) at 23:12

## 2023-11-30 RX ADMIN — HEPARIN SODIUM 5000 UNIT(S): 5000 INJECTION INTRAVENOUS; SUBCUTANEOUS at 15:30

## 2023-11-30 RX ADMIN — DORNASE ALFA 2.5 MILLIGRAM(S): 1 SOLUTION RESPIRATORY (INHALATION) at 00:33

## 2023-11-30 RX ADMIN — Medication 40 MILLIEQUIVALENT(S): at 12:32

## 2023-11-30 RX ADMIN — POLYETHYLENE GLYCOL 3350 17 GRAM(S): 17 POWDER, FOR SOLUTION ORAL at 12:33

## 2023-11-30 RX ADMIN — SODIUM CHLORIDE 4 MILLILITER(S): 9 INJECTION INTRAMUSCULAR; INTRAVENOUS; SUBCUTANEOUS at 10:22

## 2023-11-30 RX ADMIN — Medication 650 MILLIGRAM(S): at 17:36

## 2023-11-30 RX ADMIN — Medication 650 MILLIGRAM(S): at 18:06

## 2023-11-30 NOTE — PROGRESS NOTE ADULT - SUBJECTIVE AND OBJECTIVE BOX
ANESTHESIA POSTOP CHECK    63y Female POSTOP DAY 1 S/P     [ X] NO APPARENT ANESTHESIA COMPLICATIONS      Comments: 
Subjective:  Patient was seen resting comfortably in bed on RA, not in acute distress.  Patient states she feels good, admits to mild discomfort.  Denies SOB, chest pain, abdominal pain, nausea, vomiting.     Objective  Vital Signs:  Vital Signs Last 24 Hrs  T(C): 36.9 (11-30-23 @ 12:19), Max: 37.4 (11-30-23 @ 00:15)  T(F): 98.4 (11-30-23 @ 12:19), Max: 99.3 (11-30-23 @ 00:15)  HR: 63 (11-30-23 @ 12:19) (63 - 88)  BP: 120/59 (11-30-23 @ 12:19) (105/53 - 140/67)  RR: 18 (11-30-23 @ 12:19) (14 - 18)  SpO2: 97% (11-30-23 @ 12:19) (95% - 100%) on (O2)    PE:  General: A&Ox3, NAD  CV: RRR, well perfused  Resp: Breathing comfortably on RA, no resp distress, no accessory muscle use  GI: Soft, NT, ND, +BS  MSK: [place] x4  Pysch: Appropriate affect  Tubes: CT to WS, no AL, output 115 ml  Incisions: c/d/i  Relevant labs, radiology and Medications reviewed                        13.0   13.94 )-----------( 259      ( 30 Nov 2023 04:42 )             38.8     11-30    142  |  102  |  16  ----------------------------<  88  3.3<L>   |  25  |  0.85    Ca    9.4      30 Nov 2023 04:42  Phos  2.4     11-30  Mg     2.20     11-30    MEDICATIONS  (STANDING):  acetaminophen     Tablet .. 650 milliGRAM(s) Oral every 6 hours  albuterol/ipratropium for Nebulization 3 milliLiter(s) Nebulizer every 6 hours  atorvastatin 20 milliGRAM(s) Oral at bedtime  dornase jay Solution 2.5 milliGRAM(s) Inhalation every 12 hours  heparin   Injectable 5000 Unit(s) SubCutaneous every 8 hours  lidocaine   4% Patch 1 Patch Transdermal every 24 hours  polyethylene glycol 3350 17 Gram(s) Oral daily  potassium chloride    Tablet ER 40 milliEquivalent(s) Oral once  senna 2 Tablet(s) Oral at bedtime  sodium chloride 3%  Inhalation 4 milliLiter(s) Inhalation every 12 hours    MEDICATIONS  (PRN):  AQUAPHOR (petrolatum Ointment) 1 Application(s) Topical five times a day PRN dryness  naloxone Injectable 0.1 milliGRAM(s) IV Push every 3 minutes PRN For ANY of the following changes in patient status:  A. RR LESS THAN 10 breaths per minute, B. Oxygen saturation LESS THAN 90%, C. Sedation score of 6  ondansetron Injectable 4 milliGRAM(s) IV Push every 6 hours PRN Nausea  oxyCODONE    IR 2.5 milliGRAM(s) Oral every 3 hours PRN Moderate Pain (4 - 6)  oxyCODONE    IR 5 milliGRAM(s) Oral every 3 hours PRN Severe Pain (7 - 10)    Pertinent Physical Exam  I&O's Summary    29 Nov 2023 07:01  -  30 Nov 2023 07:00  --------------------------------------------------------  IN: 1360 mL / OUT: 715 mL / NET: 645 mL    30 Nov 2023 07:01  -  30 Nov 2023 12:25  --------------------------------------------------------  IN: 590 mL / OUT: 675 mL / NET: -85 mL    Assessment  62 y/o Female HTN, HLD, TB (+PPD s/p Tx) is now s/p FB, Robotic LVATS, LLL Wedge, LLL Completion Superior Segmentectomy on 11/29/23.     PLAN  Neuro: Pain management  Pulm: Encourage coughing, deep breathing and use of incentive spirometry. Daily CXR.   Cardio: Monitor telemetry/alarms  GI: Tolerating diet. Continue stool softeners.  Renal: monitor urine output, supplement electrolytes as needed  Vasc: Heparin SC/SCDs for DVT prophylaxis  Heme: Stable H/H.  ID: Off antibiotics. Stable.  Therapy: OOB/ambulate  Tubes: Monitor Chest tube output. CT will stay on suction  Patient was seen and examined at bedside by Dr. Mcfarlane  Plan above as per Dr. Mcfarlane    
Anesthesia Pain Management Service    SUBJECTIVE: Patient is doing well with IV PCA and no significant problems reported.    Pain Scale Score	At rest: 2/10___ 	With Activity: ___ 	[X ] Refer to charted pain scores    THERAPY:    [ ] IV PCA Morphine		[ ] 5 mg/mL	[ ] 1 mg/mL  [X ] IV PCA Hydromorphone	[ ] 5 mg/mL	[X ] 1 mg/mL  [ ] IV PCA Fentanyl		[ ] 50 micrograms/mL    Demand dose __0.2_ lockout __6_ (minutes) Continuous Rate _0__ Total: 0.5___   mg used (in past 24 hrs)      MEDICATIONS  (STANDING):  acetaminophen     Tablet .. 650 milliGRAM(s) Oral every 6 hours  albuterol/ipratropium for Nebulization 3 milliLiter(s) Nebulizer every 6 hours  atorvastatin 20 milliGRAM(s) Oral at bedtime  dornase jay Solution 2.5 milliGRAM(s) Inhalation every 12 hours  heparin   Injectable 5000 Unit(s) SubCutaneous every 8 hours  lactated ringers. 1000 milliLiter(s) (30 mL/Hr) IV Continuous <Continuous>  lidocaine   4% Patch 1 Patch Transdermal every 24 hours  polyethylene glycol 3350 17 Gram(s) Oral daily  senna 2 Tablet(s) Oral at bedtime  sodium chloride 3%  Inhalation 4 milliLiter(s) Inhalation every 12 hours    MEDICATIONS  (PRN):  AQUAPHOR (petrolatum Ointment) 1 Application(s) Topical five times a day PRN dryness  naloxone Injectable 0.1 milliGRAM(s) IV Push every 3 minutes PRN For ANY of the following changes in patient status:  A. RR LESS THAN 10 breaths per minute, B. Oxygen saturation LESS THAN 90%, C. Sedation score of 6  ondansetron Injectable 4 milliGRAM(s) IV Push every 6 hours PRN Nausea  oxyCODONE    IR 2.5 milliGRAM(s) Oral every 3 hours PRN Moderate Pain (4 - 6)  oxyCODONE    IR 5 milliGRAM(s) Oral every 3 hours PRN Severe Pain (7 - 10)      OBJECTIVE: Patient sitting up in chair. CTX1 in place.    Sedation Score:	[ X] Alert	[ ] Drowsy 	[ ] Arousable	[ ] Asleep	[ ] Unresponsive    Side Effects:	[X ] None	[ ] Nausea	[ ] Vomiting	[ ] Pruritus  		[ ] Other:    Vital Signs Last 24 Hrs  T(C): 36.8 (30 Nov 2023 08:30), Max: 37.4 (30 Nov 2023 00:15)  T(F): 98.3 (30 Nov 2023 08:30), Max: 99.3 (30 Nov 2023 00:15)  HR: 88 (30 Nov 2023 08:30) (64 - 88)  BP: 136/77 (30 Nov 2023 08:30) (105/53 - 140/67)  BP(mean): 69 (29 Nov 2023 13:00) (65 - 91)  RR: 18 (30 Nov 2023 08:30) (11 - 23)  SpO2: 99% (30 Nov 2023 08:30) (95% - 100%)    Parameters below as of 30 Nov 2023 08:30  Patient On (Oxygen Delivery Method): room air        ASSESSMENT/ PLAN    Therapy to  be:	[ ] Continue   [ X] Discontinued   [X ] Change to prn Analgesics    Documentation and Verification of current medications:   [X] Done	[ ] Not done, not elligible    Comments: IV PCA discontinued. Plan discussed with primary team. PRN Oral/IV opioids and/or Adjuvant non-opioid medication to be ordered at this point.    Progress Note written now but Patient was seen earlier.
Anesthesia Pain Management Service- Attending Addendum    SUBJECTIVE: Patient's pain control adequate    Therapy:	  [ X] IV PCA	   [ ] Epidural           [ ] s/p Spinal Opoid              [ ] Postpartum infusion	  [ ] Patient controlled regional anesthesia (PCRA)    [ ] prn Analgesics    Allergies    No Known Allergies    Intolerances      MEDICATIONS  (STANDING):  acetaminophen     Tablet .. 650 milliGRAM(s) Oral every 6 hours  albuterol/ipratropium for Nebulization 3 milliLiter(s) Nebulizer every 6 hours  atorvastatin 20 milliGRAM(s) Oral at bedtime  dornase jay Solution 2.5 milliGRAM(s) Inhalation every 12 hours  heparin   Injectable 5000 Unit(s) SubCutaneous every 8 hours  lactated ringers. 1000 milliLiter(s) (30 mL/Hr) IV Continuous <Continuous>  lidocaine   4% Patch 1 Patch Transdermal every 24 hours  polyethylene glycol 3350 17 Gram(s) Oral daily  potassium chloride    Tablet ER 40 milliEquivalent(s) Oral once  senna 2 Tablet(s) Oral at bedtime  sodium chloride 3%  Inhalation 4 milliLiter(s) Inhalation every 12 hours    MEDICATIONS  (PRN):  AQUAPHOR (petrolatum Ointment) 1 Application(s) Topical five times a day PRN dryness  naloxone Injectable 0.1 milliGRAM(s) IV Push every 3 minutes PRN For ANY of the following changes in patient status:  A. RR LESS THAN 10 breaths per minute, B. Oxygen saturation LESS THAN 90%, C. Sedation score of 6  ondansetron Injectable 4 milliGRAM(s) IV Push every 6 hours PRN Nausea  oxyCODONE    IR 5 milliGRAM(s) Oral every 3 hours PRN Severe Pain (7 - 10)  oxyCODONE    IR 2.5 milliGRAM(s) Oral every 3 hours PRN Moderate Pain (4 - 6)      OBJECTIVE:   [X] No new signs     [ ] Other:    Side Effects:  [X ] None			[ ] Other:      ASSESSMENT/PLAN  -Discontinue current therapy    [ ] Therapy changed to:    [ ] IV PCA       [ ] Epidural     [ X] prn Analgesics     Comments: Pain management per primary team, APS to sign off
Patient seen resting comfortably in bed on RA, in NAD.  Patient states she feels good, denies significant pain, admits to mild discomfort.  Denies any acute complaints at this time.    Vital Signs Last 24 Hrs  T(C): 36.3 (29 Nov 2023 17:51), Max: 36.8 (29 Nov 2023 11:30)  T(F): 97.3 (29 Nov 2023 17:51), Max: 98.2 (29 Nov 2023 11:30)  HR: 73 (29 Nov 2023 17:51) (64 - 78)  BP: 129/69 (29 Nov 2023 17:51) (105/53 - 143/89)  BP(mean): 69 (29 Nov 2023 13:00) (65 - 91)  RR: 18 (29 Nov 2023 17:51) (11 - 23)  SpO2: 98% (29 Nov 2023 17:51) (95% - 100%)    Parameters below as of 29 Nov 2023 15:20  Patient On (Oxygen Delivery Method): room air    General: A&Ox3, NAD  CV: RRR, well perfused  Resp: Breathing comfortably on RA, no resp distress, no accessory muscle use  GI: Soft, NT, ND, +BS  MSK: [place] x4  Pysch: Appropriate affect  Tubes: CT to sxn, no AL  Incisions: c/d/i    64 y/o Female HTN, HLD, TB (+PPD s/p Tx) is now s/p FB, Robotic LVATS, LLL Wedge, LLL Completion Superior Segmentectomy on 11/29/23.    - Cont. CT to sxn  - Monitor CT output and AL  - CXR stable, daily CXRs.  - Pain control w/ PCA  - AM labs  - Possible CT to WS in AM.

## 2023-11-30 NOTE — DISCHARGE NOTE PROVIDER - CARE PROVIDER_API CALL
Milton Mcfarlane  Thoracic Surgery  [address] [place] [address], Floor [address] [place] [address]  Phone: [phone]  Fax: [phone]  Established Patient  Follow Up Time: 2 weeks   Milton Mcfarlane  Thoracic Surgery  [id number] [place] [address], Floor [address] [place] [address]  Phone: [phone]  Fax: [phone]  Established Patient  Follow Up Time: 2 weeks   Milton Mcfarlane  Thoracic Surgery  [address] [place] [address], Floor 3 [place] [address]  Phone: [phone]  Fax: [phone]  Established Patient  Follow Up Time: 2 weeks

## 2023-11-30 NOTE — DISCHARGE NOTE PROVIDER - NSDCFUADDINST_GEN_ALL_CORE_FT
Keep the chest tube site clean with soap and water.  Allow it to dry and leave it open to air as much as possible.  Some drainage is normal but watch for pus, increasing redness, fevers, difficulty breathing or other unusual symptoms and if noted, call Dr Mcfarlane.  The suture will come out at the office.

## 2023-11-30 NOTE — DISCHARGE NOTE PROVIDER - NSDCCPTREATMENT_GEN_ALL_CORE_FT
PRINCIPAL PROCEDURE  Procedure: Segmentectomy of lung  Findings and Treatment:       SECONDARY PROCEDURE  Procedure: Bronchoscopy, flexible, adult  Findings and Treatment:     Procedure: Robotic assisted procedure, thoracoscopic  Findings and Treatment:     Procedure: Robot-assisted mediastinal lymphadenectomy  Findings and Treatment:     Procedure: Wedge resection, lung  Findings and Treatment:

## 2023-11-30 NOTE — DISCHARGE NOTE PROVIDER - PROVIDER TOKENS
PROVIDER:[TOKEN:[[id number]],FOLLOWUP:[2 weeks],ESTABLISHEDPATIENT:[T]] PROVIDER:[TOKEN:[[id number]],FOLLOWUP:[2 weeks],ESTABLISHEDPATIENT:[T]] PROVIDER:[TOKEN:[[id number]],FOLLOWUP:[2 weeks],ESTABLISHEDPATIENT:[T]]

## 2023-11-30 NOTE — PHYSICAL THERAPY INITIAL EVALUATION ADULT - PERTINENT HX OF CURRENT PROBLEM, REHAB EVAL
63 year old female with H/O: HTN, HLD, Obesity, smokers x 40 years presents for pre op evaluation stated that she was evaluated by hematologist due to elevated WBC. S/P CT scan 2022 which showed "a nodule in the left lung". F/U yearly, S/P CT scan in 2023 showed that the nodule got bigger. Pt was then referred to surgeon for further consultation. Now schedule for left video assisted thoracoscopy, robotic assisted, left lower lobe segmentectomy, possible lobectomy, mediastinal lymph node dissection tentatively on 11/29/23

## 2023-11-30 NOTE — PHYSICAL THERAPY INITIAL EVALUATION ADULT - PLANNED THERAPY INTERVENTIONS, PT EVAL
Patient left sitting in chair, in NAD, call bell in reach, all lines intact. Patient left sitting in chair, in NAD, call bell in reach, all lines intact./balance training/bed mobility training/gait training/transfer training

## 2023-11-30 NOTE — PHYSICAL THERAPY INITIAL EVALUATION ADULT - PATIENT PROFILE REVIEW, REHAB EVAL
Plan:  1. Medication changes: None    2. Testing: Pulmonary HTN workup  Call to schedule tests: VQ scan, cxr [phone]   Holter monitor [phone]  At home sleep study: [phone] or [phone]    3. Labs: Labs in system (RENETTA, Iron, immunofixation studies, proBNP)    4. Follow up: 2 months with Dr. Finley    Will evaluate for right heart catheterization after results come back    Continue to follow these guidelines unless otherwise instructed by your doctor:    2000mg sodium diet with NO added salt to food  Activity as instructed by your doctor:  Drink 64 ounces of fluid per day, total  Weigh yourself daily at the same time each day and keep a record of it  Report weight gain of 3 lbs in 1 day or 5 lbs or more in 1 week    CONTINUE TO TAKE YOUR MEDICATIONS AS PRESCRIBED   BRING YOUR MEDICATION LIST TO EACH VISIT    Please call the Heart Failure Office at [phone] if you are unable to keep your appointment or if you have any questions or concerns.    
yes

## 2023-11-30 NOTE — DISCHARGE NOTE PROVIDER - HOSPITAL COURSE
64 y/o F with H/O: HTN, HLD, Obesity, smoker x 40 years was evaluated by hematologist due to elevated WBC. A CT scan in 2022 showed a left lung nodule. A follow up CT scan in 2023 showed that the nodule had enlarged. Therefore, she underwent a FB, Robot-assisted L VATS, LLL superior segmentectomy, LLL wedge, and MLND on 11/29/23.  Her chest tube was monitored for bloody drainage but was able to be removed prior to discharge home. 62 y/o F with H/O: HTN, HLD, Obesity, smoker x 40 years was evaluated by hematologist due to elevated WBC. A CT scan in 2022 showed a left lung nodule. A follow up CT scan in 2023 showed that the nodule had enlarged. Therefore, she underwent a FB, Robot-assisted L VATS, LLL superior segmentectomy, LLL wedge, and MLND on 11/29/23.  Her chest tube was monitored for bloody drainage but was able to be removed prior to discharge home. 62 y/o F with H/O: HTN, HLD, Obesity, smoker x 40 years was evaluated by hematologist due to elevated WBC. A CT scan in 2022 showed a left lung nodule. A follow up CT scan in 2023 showed that the nodule had enlarged. Therefore, she underwent a FB, Robot-assisted L VATS, LLL superior segmentectomy, LLL wedge, and MLND on 11/29/23.  Postop course routine, chest tube removed. Pt seen and examined by thoracic team on day of discharge and presented to attendings on morning TEAMS report. She was deemed stable for discharge home to follow up as an outpatient.     Vital Signs Last 24 Hrs  T(C): 37 (01 Dec 2023 12:14), Max: 37.3 (01 Dec 2023 00:23)  T(F): 98.6 (01 Dec 2023 12:14), Max: 99.2 (01 Dec 2023 00:23)  HR: 85 (01 Dec 2023 12:14) (77 - 90)  BP: 119/66 (01 Dec 2023 12:14) (119/66 - 140/61)  BP(mean): --  RR: 18 (01 Dec 2023 12:14) (17 - 19)  SpO2: 96% (01 Dec 2023 12:14) (95% - 100%)    Parameters below as of 01 Dec 2023 12:14  Patient On (Oxygen Delivery Method): room air    PHYSICAL EXAM:  Gen: NAD  Resp: Respirations unlabored. Bilateral chest rise.   Card: RRR.   GI: Soft. Nontender. Nondistended.   Skin: Warm, well perfused, no masses or organomegaly  EXT: No clubbing, cyanosis, or edema  surgical site c,d,i     64 y/o F with H/O: HTN, HLD, Obesity, smoker x 40 years was evaluated by hematologist due to elevated WBC. A CT scan in 2022 showed a left lung nodule. A follow up CT scan in 2023 showed that the nodule had enlarged. Therefore, she underwent a FB, Robot-assisted L VATS, LLL superior segmentectomy, LLL wedge, and MLND on 11/29/23.  Postop course routine, chest tube removed. Pt seen and examined by thoracic team on day of discharge and presented to attendings on morning TEAMS report. She was deemed stable for discharge home to follow up as an outpatient.     Vital Signs Last 24 Hrs  T(C): 37 (01 Dec 2023 12:14), Max: 37.3 (01 Dec 2023 00:23)  T(F): 98.6 (01 Dec 2023 12:14), Max: 99.2 (01 Dec 2023 00:23)  HR: 85 (01 Dec 2023 12:14) (77 - 90)  BP: 119/66 (01 Dec 2023 12:14) (119/66 - 140/61)  BP(mean): --  RR: 18 (01 Dec 2023 12:14) (17 - 19)  SpO2: 96% (01 Dec 2023 12:14) (95% - 100%)    Parameters below as of 01 Dec 2023 12:14  Patient On (Oxygen Delivery Method): room air    PHYSICAL EXAM:  Gen: NAD  Resp: Respirations unlabored. Bilateral chest rise.   Card: RRR.   GI: Soft. Nontender. Nondistended.   Skin: Warm, well perfused, no masses or organomegaly  EXT: No clubbing, cyanosis, or edema  surgical site c,d,i

## 2023-11-30 NOTE — DISCHARGE NOTE PROVIDER - NSDCFUADDAPPT_GEN_ALL_CORE_FT
See Dr Mcfarlane in 2 weeks.  Call for an appointment and bring a new chest X-ray with you.  See your PCP as well in about a week or two.   See Dr Mcfarlane in 2 weeks.    Call for an appointment   have a chest xray prior to appointment and bring films  (you can arrange to have chest xray done on 2nd floor of [facility] before your follow up appointment on 3rd floor - speak with office staff to arrange)   See Dr Mcfarlane in 2 weeks.    Call for an appointment   have a chest xray prior to appointment and bring films  (you can arrange to have chest xray done on 2nd floor of [facility] before your follow up appointment on 3rd floor - speak with office staff to arrange)   See Dr Mcfarlane in 2 weeks.    Call for an appointment   have a chest xray prior to appointment and bring films  (you can arrange to have chest xray done on 2nd floor of [facility] before your follow up appointment on 3rd floor - speak with office staff to arrange)

## 2023-11-30 NOTE — DISCHARGE NOTE PROVIDER - NSDCCPCAREPLAN_GEN_ALL_CORE_FT
PRINCIPAL DISCHARGE DIAGNOSIS  Diagnosis: Nodule of left lung  Assessment and Plan of Treatment:

## 2023-11-30 NOTE — DISCHARGE NOTE PROVIDER - CARE PROVIDERS DIRECT ADDRESSES
,[email or web address]

## 2023-11-30 NOTE — DISCHARGE NOTE PROVIDER - NSDCMRMEDTOKEN_GEN_ALL_CORE_FT
aspirin 81 mg oral tablet: 1 tab(s) orally once a day  atorvastatin 20 mg oral tablet: 1 tab(s) orally once a day (at bedtime)  triamterene-hydrochlorothiazide 37.5 mg-25 mg oral tablet: 1 tab(s) orally once a day (at bedtime)   acetaminophen 325 mg oral tablet: 2 tab(s) orally every 6 hours  aspirin 81 mg oral tablet: 1 tab(s) orally once a day  atorvastatin 20 mg oral tablet: 1 tab(s) orally once a day (at bedtime)  CXR PA and lateral: R91.8  oxyCODONE 5 mg oral tablet: 1 tab(s) orally every 4 hours (5 times/day) as needed for Severe Pain (7 - 10) MDD: 6  polyethylene glycol 3350 oral powder for reconstitution: 17 gram(s) orally once a day  senna leaf extract oral tablet: 2 tab(s) orally once a day (at bedtime)  triamterene-hydrochlorothiazide 37.5 mg-25 mg oral tablet: 1 tab(s) orally once a day (at bedtime)

## 2023-12-01 ENCOUNTER — TRANSCRIPTION ENCOUNTER (OUTPATIENT)
Age: 63
End: 2023-12-01

## 2023-12-01 VITALS
OXYGEN SATURATION: 98 % | TEMPERATURE: 98 F | SYSTOLIC BLOOD PRESSURE: 134 MMHG | DIASTOLIC BLOOD PRESSURE: 70 MMHG | RESPIRATION RATE: 18 BRPM | HEART RATE: 75 BPM

## 2023-12-01 LAB
ANION GAP SERPL CALC-SCNC: 12 MMOL/L — SIGNIFICANT CHANGE UP (ref 7–14)
ANION GAP SERPL CALC-SCNC: 12 MMOL/L — SIGNIFICANT CHANGE UP (ref 7–14)
BUN SERPL-MCNC: 13 MG/DL — SIGNIFICANT CHANGE UP (ref 7–23)
BUN SERPL-MCNC: 13 MG/DL — SIGNIFICANT CHANGE UP (ref 7–23)
CALCIUM SERPL-MCNC: 9.2 MG/DL — SIGNIFICANT CHANGE UP (ref 8.4–10.5)
CALCIUM SERPL-MCNC: 9.2 MG/DL — SIGNIFICANT CHANGE UP (ref 8.4–10.5)
CHLORIDE SERPL-SCNC: 106 MMOL/L — SIGNIFICANT CHANGE UP (ref 98–107)
CHLORIDE SERPL-SCNC: 106 MMOL/L — SIGNIFICANT CHANGE UP (ref 98–107)
CO2 SERPL-SCNC: 25 MMOL/L — SIGNIFICANT CHANGE UP (ref 22–31)
CO2 SERPL-SCNC: 25 MMOL/L — SIGNIFICANT CHANGE UP (ref 22–31)
CREAT SERPL-MCNC: 0.74 MG/DL — SIGNIFICANT CHANGE UP (ref 0.5–1.3)
CREAT SERPL-MCNC: 0.74 MG/DL — SIGNIFICANT CHANGE UP (ref 0.5–1.3)
EGFR: 91 ML/MIN/1.73M2 — SIGNIFICANT CHANGE UP
EGFR: 91 ML/MIN/1.73M2 — SIGNIFICANT CHANGE UP
GLUCOSE SERPL-MCNC: 96 MG/DL — SIGNIFICANT CHANGE UP (ref 70–99)
GLUCOSE SERPL-MCNC: 96 MG/DL — SIGNIFICANT CHANGE UP (ref 70–99)
HCT VFR BLD CALC: 38.7 % — SIGNIFICANT CHANGE UP (ref 34.5–45)
HCT VFR BLD CALC: 38.7 % — SIGNIFICANT CHANGE UP (ref 34.5–45)
HGB BLD-MCNC: 12.8 G/DL — SIGNIFICANT CHANGE UP (ref 11.5–15.5)
HGB BLD-MCNC: 12.8 G/DL — SIGNIFICANT CHANGE UP (ref 11.5–15.5)
MAGNESIUM SERPL-MCNC: 2.3 MG/DL — SIGNIFICANT CHANGE UP (ref 1.6–2.6)
MAGNESIUM SERPL-MCNC: 2.3 MG/DL — SIGNIFICANT CHANGE UP (ref 1.6–2.6)
MCHC RBC-ENTMCNC: 30.4 PG — SIGNIFICANT CHANGE UP (ref 27–34)
MCHC RBC-ENTMCNC: 30.4 PG — SIGNIFICANT CHANGE UP (ref 27–34)
MCHC RBC-ENTMCNC: 33.1 GM/DL — SIGNIFICANT CHANGE UP (ref 32–36)
MCHC RBC-ENTMCNC: 33.1 GM/DL — SIGNIFICANT CHANGE UP (ref 32–36)
MCV RBC AUTO: 91.9 FL — SIGNIFICANT CHANGE UP (ref 80–100)
MCV RBC AUTO: 91.9 FL — SIGNIFICANT CHANGE UP (ref 80–100)
NRBC # BLD: 0 /100 WBCS — SIGNIFICANT CHANGE UP (ref 0–0)
NRBC # BLD: 0 /100 WBCS — SIGNIFICANT CHANGE UP (ref 0–0)
NRBC # FLD: 0 K/UL — SIGNIFICANT CHANGE UP (ref 0–0)
NRBC # FLD: 0 K/UL — SIGNIFICANT CHANGE UP (ref 0–0)
PHOSPHATE SERPL-MCNC: 2.6 MG/DL — SIGNIFICANT CHANGE UP (ref 2.5–4.5)
PHOSPHATE SERPL-MCNC: 2.6 MG/DL — SIGNIFICANT CHANGE UP (ref 2.5–4.5)
PLATELET # BLD AUTO: 250 K/UL — SIGNIFICANT CHANGE UP (ref 150–400)
PLATELET # BLD AUTO: 250 K/UL — SIGNIFICANT CHANGE UP (ref 150–400)
POTASSIUM SERPL-MCNC: 4.2 MMOL/L — SIGNIFICANT CHANGE UP (ref 3.5–5.3)
POTASSIUM SERPL-MCNC: 4.2 MMOL/L — SIGNIFICANT CHANGE UP (ref 3.5–5.3)
POTASSIUM SERPL-SCNC: 4.2 MMOL/L — SIGNIFICANT CHANGE UP (ref 3.5–5.3)
POTASSIUM SERPL-SCNC: 4.2 MMOL/L — SIGNIFICANT CHANGE UP (ref 3.5–5.3)
RBC # BLD: 4.21 M/UL — SIGNIFICANT CHANGE UP (ref 3.8–5.2)
RBC # BLD: 4.21 M/UL — SIGNIFICANT CHANGE UP (ref 3.8–5.2)
RBC # FLD: 13 % — SIGNIFICANT CHANGE UP (ref 10.3–14.5)
RBC # FLD: 13 % — SIGNIFICANT CHANGE UP (ref 10.3–14.5)
SODIUM SERPL-SCNC: 143 MMOL/L — SIGNIFICANT CHANGE UP (ref 135–145)
SODIUM SERPL-SCNC: 143 MMOL/L — SIGNIFICANT CHANGE UP (ref 135–145)
WBC # BLD: 12.64 K/UL — HIGH (ref 3.8–10.5)
WBC # BLD: 12.64 K/UL — HIGH (ref 3.8–10.5)
WBC # FLD AUTO: 12.64 K/UL — HIGH (ref 3.8–10.5)
WBC # FLD AUTO: 12.64 K/UL — HIGH (ref 3.8–10.5)

## 2023-12-01 PROCEDURE — 71045 X-RAY EXAM CHEST 1 VIEW: CPT | Mod: 26

## 2023-12-01 RX ORDER — POLYETHYLENE GLYCOL 3350 17 G/17G
17 POWDER, FOR SOLUTION ORAL
Qty: 0 | Refills: 0 | DISCHARGE
Start: 2023-12-01

## 2023-12-01 RX ORDER — ACETAMINOPHEN 500 MG
2 TABLET ORAL
Qty: 0 | Refills: 0 | DISCHARGE
Start: 2023-12-01

## 2023-12-01 RX ORDER — SENNA PLUS 8.6 MG/1
2 TABLET ORAL
Qty: 0 | Refills: 0 | DISCHARGE
Start: 2023-12-01

## 2023-12-01 RX ORDER — OXYCODONE HYDROCHLORIDE 5 MG/1
1 TABLET ORAL
Qty: 25 | Refills: 0
Start: 2023-12-01 | End: 2023-12-05

## 2023-12-01 RX ADMIN — Medication 650 MILLIGRAM(S): at 05:57

## 2023-12-01 RX ADMIN — Medication 650 MILLIGRAM(S): at 13:19

## 2023-12-01 RX ADMIN — Medication 3 MILLILITER(S): at 15:34

## 2023-12-01 RX ADMIN — DORNASE ALFA 2.5 MILLIGRAM(S): 1 SOLUTION RESPIRATORY (INHALATION) at 10:21

## 2023-12-01 RX ADMIN — LIDOCAINE 1 PATCH: 4 CREAM TOPICAL at 01:34

## 2023-12-01 RX ADMIN — Medication 650 MILLIGRAM(S): at 12:42

## 2023-12-01 RX ADMIN — SODIUM CHLORIDE 4 MILLILITER(S): 9 INJECTION INTRAMUSCULAR; INTRAVENOUS; SUBCUTANEOUS at 10:21

## 2023-12-01 RX ADMIN — Medication 3 MILLILITER(S): at 05:07

## 2023-12-01 RX ADMIN — Medication 3 MILLILITER(S): at 10:21

## 2023-12-01 RX ADMIN — HEPARIN SODIUM 5000 UNIT(S): 5000 INJECTION INTRAVENOUS; SUBCUTANEOUS at 05:58

## 2023-12-01 NOTE — DISCHARGE NOTE NURSING/CASE MANAGEMENT/SOCIAL WORK - PATIENT PORTAL LINK FT
You can access the FollowMyHealth Patient Portal offered by [facility] by registering at the following website: [email or web address]. By joining [employer]’s FollowMyHealth portal, you will also be able to view your health information using other applications (apps) compatible with our system.

## 2023-12-01 NOTE — DISCHARGE NOTE NURSING/CASE MANAGEMENT/SOCIAL WORK - NSDCFUADDAPPT_GEN_ALL_CORE_FT
See Dr Mcfarlane in 2 weeks.    Call for an appointment   have a chest xray prior to appointment and bring films  (you can arrange to have chest xray done on 2nd floor of [facility] before your follow up appointment on 3rd floor - speak with office staff to arrange)

## 2023-12-01 NOTE — DISCHARGE NOTE NURSING/CASE MANAGEMENT/SOCIAL WORK - NSDCPEFALRISK_GEN_ALL_CORE
For information on Fall & Injury Prevention, visit: [email or web address] OR  [email or web address] OR  https://www.cdc.gov/steadi/patient.html

## 2023-12-04 PROBLEM — E66.9 OBESITY, UNSPECIFIED: Chronic | Status: ACTIVE | Noted: 2023-11-21

## 2023-12-04 PROBLEM — E78.5 HYPERLIPIDEMIA, UNSPECIFIED: Chronic | Status: ACTIVE | Noted: 2023-11-21

## 2023-12-04 PROBLEM — I10 ESSENTIAL (PRIMARY) HYPERTENSION: Chronic | Status: ACTIVE | Noted: 2023-11-21

## 2023-12-04 PROBLEM — R91.8 OTHER NONSPECIFIC ABNORMAL FINDING OF LUNG FIELD: Chronic | Status: ACTIVE | Noted: 2023-11-21

## 2023-12-13 ENCOUNTER — NON-APPOINTMENT (OUTPATIENT)
Age: 63
End: 2023-12-13

## 2023-12-13 LAB
SURGICAL PATHOLOGY STUDY: SIGNIFICANT CHANGE UP

## 2023-12-14 ENCOUNTER — OUTPATIENT (OUTPATIENT)
Dept: OUTPATIENT SERVICES | Facility: HOSPITAL | Age: 63
LOS: 1 days | End: 2023-12-14
Payer: COMMERCIAL

## 2023-12-14 ENCOUNTER — RESULT REVIEW (OUTPATIENT)
Age: 63
End: 2023-12-14

## 2023-12-14 ENCOUNTER — APPOINTMENT (OUTPATIENT)
Dept: THORACIC SURGERY | Facility: CLINIC | Age: 63
End: 2023-12-14
Payer: COMMERCIAL

## 2023-12-14 ENCOUNTER — APPOINTMENT (OUTPATIENT)
Dept: RADIOLOGY | Facility: HOSPITAL | Age: 63
End: 2023-12-14

## 2023-12-14 VITALS
BODY MASS INDEX: 33.82 KG/M2 | HEIGHT: 65 IN | OXYGEN SATURATION: 98 % | DIASTOLIC BLOOD PRESSURE: 85 MMHG | HEART RATE: 60 BPM | RESPIRATION RATE: 18 BRPM | WEIGHT: 203 LBS | SYSTOLIC BLOOD PRESSURE: 143 MMHG

## 2023-12-14 DIAGNOSIS — Z98.890 OTHER SPECIFIED POSTPROCEDURAL STATES: Chronic | ICD-10-CM

## 2023-12-14 DIAGNOSIS — R91.8 OTHER NONSPECIFIC ABNORMAL FINDING OF LUNG FIELD: ICD-10-CM

## 2023-12-14 PROCEDURE — 99024 POSTOP FOLLOW-UP VISIT: CPT

## 2023-12-14 PROCEDURE — 71046 X-RAY EXAM CHEST 2 VIEWS: CPT | Mod: 26

## 2023-12-14 NOTE — ASSESSMENT
[FreeTextEntry1] : Ms. 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 Rico Rose.  CT Chest on 10/23/23 at [facility]: - **an increasing size 1.2 cm semisolid nodule in the LLL (5: 124; previously 0.8 cm). The solid component measures 0.7 cm in diameter, also enlarged. - a 0.3 cm right apical posterior nodule without change (5: 34) - a 0.4 cm RUL medial nodule without change (5: 72) - a 0.3 cm calcified granuloma in the TERESITA (5: 97). - a 0.2 cm nodule along the right major fissure without change (5: 103). - a 0.3 cm nodule in the RLL posteriorly without change (5: 103). - a 0.4 cm subpleural nodule in the RLL posteriorly without change (5: 112). - a 0.2 cm nodule along the left major fissure without change (5: 116). - a 0.4 cm nodule along the right major fissure without change (5: 123). - a 0.8 cm groundglass nodule in the RLL without change (5: 129). - a 0.5 cm nodule in the LLL posteriorly without change (5: 149). - a 0.4 cm nodule in the RLL laterally without change (5: 163). - a 0.4 cm RLL nodule without change (5: 170). - a 0.7 cm nodule in the medial basal segment of the RLL without change (5: 172). - a 0.6 cm subpleural nodule in the right lung base without change (5: 192). - **a 1.1 cm oval nodule at the left base without change (5: 199). There is an adjacent 0.5 cm subpleural nodule without change. - a 0.9 cm nodule in the medial basal segment of the RLL without change (5: 220). - There is no change in discoid atelectasis in the lingula. - There is a small area of focal bronchiectasis in the medial basal segment of the right lower lobe without change.  PET/CT on 11/14/23 at [facility]: - low-grade uptake associated with the enlarging mixed density 1.1 cm nodule in the posterior left lower lobe on series 11/image 94 (maximum SUV 1.7), obscured by respiratory and not obviously changed since 10/2023 but definitely increased since 9/2022.  - By way of comparison, uptake associated with the largest of a stable appearing noncalcified nodules, including the focus in the anterior lingula/lateral left upper lobe on image 109 are associated with similar grade uptake (maximum SUV 2.0).  - no abnormally enlarged or hypermetabolic lymph nodes in the mediastinum or in the rest of the chest. - benign-appearing hepatorenal cystic foci. - cholelithiasis without CT evidence of cholecystitis.  PFTs on 11/21/23: %, FEV1 117%, DLCO 93%.  Now s/p a Flex Bronch, Lt VATS R.A. LLL superior segmentectomy, wedge rxn of LLL nodules, MLND on 11/29/23. Path revealed Atypical pneumocyte proliferation (0.9 cm) associated with marked lymphoid hyperplasia most consistent with adenocarcinoma in situ, non-mucinous with prominent lymphoid reaction.  No invasion is seen. Tumor nodule is 0.6 cm from the resection margin. All LNs negative for carcinoma. Immunohistochemistry for TTF-1 highlights the pneumocytes and immunohistochemistry for CD20, CD3, and CD21 highlight lymphoid hyperplasia.  Patient presents today for post op evaluation. Overall, feels well. Good pain control.   CXR today - Stable, post op changes  I have reviewed the patient's medical records and diagnostic images at time of this office consultation and have made the following recommendation: 1. Path reviewed with patient. No need for adjuvant treatment at this time. Recommend returning to clinic in 3 months with repeat non contrast CT Chest to evaluate post op findings as well as re-evaluate stability of remaining lung nodules.  2. Patient hypertensive in office. Follow up with PCP or Card for further management.   Recommendations reviewed with patient during this office visit, and all questions answered; Patient instructed on the importance of follow up and verbalizes understanding.  I, ROSEMARY Guidry, personally performed the evaluation and management (E/M) services for this established patient. That E/M includes conducting the examination, assessing all new/exacerbated conditions, and establishing a new plan of care. Today, My ACP, Tiffany Parker, was here to observe my evaluation and management services for this patient to be followed going forward.

## 2023-12-14 NOTE — REASON FOR VISIT
[de-identified] : Flex Bronch, Lt VATS R.A. LLL superior segmentectomy, wedge rxn of LLL nodules, MLND [de-identified] : 11/29/23

## 2023-12-14 NOTE — CONSULT LETTER
[FreeTextEntry2] : Dr. Flora Rivas (Hem/Onc/Referring) [FreeTextEntry3] : Milton Mcfarlane MD, MPH  System Director of Thoracic Surgery  Director of Comprehensive Lung and Foregut [place]  Professor Cardiovascular & Thoracic Surgery   [facility] [employer] at [facility] [phone] [place] [address] [place] [address] Tel: [phone] Fax: [phone]

## 2023-12-14 NOTE — PHYSICAL EXAM
[] : no respiratory distress [Respiration, Rhythm And Depth] : normal respiratory rhythm and effort [Exaggerated Use Of Accessory Muscles For Inspiration] : no accessory muscle use [Auscultation Breath Sounds / Voice Sounds] : lungs were clear to auscultation bilaterally [Clean] : clean [Dry] : dry [Healing Well] : healing well [No Edema] : no edema

## 2024-03-21 ENCOUNTER — TRANSCRIPTION ENCOUNTER (OUTPATIENT)
Age: 64
End: 2024-03-21

## 2024-03-21 ENCOUNTER — APPOINTMENT (OUTPATIENT)
Dept: THORACIC SURGERY | Facility: CLINIC | Age: 64
End: 2024-03-21
Payer: COMMERCIAL

## 2024-03-21 ENCOUNTER — NON-APPOINTMENT (OUTPATIENT)
Age: 64
End: 2024-03-21

## 2024-03-21 VITALS
DIASTOLIC BLOOD PRESSURE: 83 MMHG | HEIGHT: 65 IN | WEIGHT: 218 LBS | SYSTOLIC BLOOD PRESSURE: 134 MMHG | HEART RATE: 60 BPM | BODY MASS INDEX: 36.32 KG/M2 | RESPIRATION RATE: 17 BRPM | OXYGEN SATURATION: 98 %

## 2024-03-21 DIAGNOSIS — R91.8 OTHER NONSPECIFIC ABNORMAL FINDING OF LUNG FIELD: ICD-10-CM

## 2024-03-21 DIAGNOSIS — D09.9 CARCINOMA IN SITU, UNSPECIFIED: ICD-10-CM

## 2024-03-21 PROCEDURE — 99213 OFFICE O/P EST LOW 20 MIN: CPT

## 2024-03-21 NOTE — HISTORY OF PRESENT ILLNESS
[FreeTextEntry1] : Ms. 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 Rico Rose.  CT Chest on 10/23/23 at [facility]: - **an increasing size 1.2 cm semisolid nodule in the LLL (5: 124; previously 0.8 cm). The solid component measures 0.7 cm in diameter, also enlarged. - a 0.3 cm right apical posterior nodule without change (5: 34) - a 0.4 cm RUL medial nodule without change (5: 72) - a 0.3 cm calcified granuloma in the TERESITA (5: 97). - a 0.2 cm nodule along the right major fissure without change (5: 103). - a 0.3 cm nodule in the RLL posteriorly without change (5: 103). - a 0.4 cm subpleural nodule in the RLL posteriorly without change (5: 112). - a 0.2 cm nodule along the left major fissure without change (5: 116). - a 0.4 cm nodule along the right major fissure without change (5: 123). - a 0.8 cm groundglass nodule in the RLL without change (5: 129). - a 0.5 cm nodule in the LLL posteriorly without change (5: 149). - a 0.4 cm nodule in the RLL laterally without change (5: 163). - a 0.4 cm RLL nodule without change (5: 170). - a 0.7 cm nodule in the medial basal segment of the RLL without change (5: 172). - a 0.6 cm subpleural nodule in the right lung base without change (5: 192). - **a 1.1 cm oval nodule at the left base without change (5: 199). There is an adjacent 0.5 cm subpleural nodule without change. - a 0.9 cm nodule in the medial basal segment of the RLL without change (5: 220). - There is no change in discoid atelectasis in the lingula. - There is a small area of focal bronchiectasis in the medial basal segment of the right lower lobe without change.  PET/CT on 11/14/23 at [facility]: - low-grade uptake associated with the enlarging mixed density 1.1 cm nodule in the posterior left lower lobe on series 11/image 94 (maximum SUV 1.7), obscured by respiratory and not obviously changed since 10/2023 but definitely increased since 9/2022.  - By way of comparison, uptake associated with the largest of a stable appearing noncalcified nodules, including the focus in the anterior lingula/lateral left upper lobe on image 109 are associated with similar grade uptake (maximum SUV 2.0).  - no abnormally enlarged or hypermetabolic lymph nodes in the mediastinum or in the rest of the chest. - benign-appearing hepatorenal cystic foci. - cholelithiasis without CT evidence of cholecystitis.  PFTs on 11/21/23: %, FEV1 117%, DLCO 93%.  Now 3.5 mo s/p a Flex Bronch, Lt VATS R.A. LLL superior segmentectomy, wedge rxn of LLL nodules, MLND on 11/29/23. Path revealed Atypical pneumocyte proliferation (0.9 cm) associated with marked lymphoid hyperplasia most consistent with adenocarcinoma in situ, non-mucinous with prominent lymphoid reaction.  No invasion is seen. Tumor nodule is 0.6 cm from the resection margin. All LNs negative for carcinoma. Immunohistochemistry for TTF-1 highlights the pneumocytes and immunohistochemistry for CD20, CD3, and CD21 highlight lymphoid hyperplasia.  CT Chest on 3/7/24 at [facility]: - post-op changes - a 5 mm solid nodule in the posterior LLL on series 3/image 147 likely corresponds to a previously identified 3 mm nodule on prior series 5/image 150. - a 4 mm calcified granuloma in the lateral left upper lobe on image 98 is unchanged.  - a 3 mm semisolid nodule in the central mid left upper lobe on image 113 was not definitely present on the prior study. - Architectural changes in the right lung appear relatively stable with revisualization of scattered noncalcified nodular foci measuring up to 7 mm in size, previously not discretely hypermetabolic. - No pleural effusion. No pericardial effusion. - Stable hilar contours. Stable to minimal increasing size of nonenlarged lymph nodes in the mediastinum. No mediastinal soft tissue mass.  Patient presents today for follow up. She reports some numbness at the left breast, chest and abdominal area which is getting better over time; denies any CP, SOB or cough.

## 2024-03-21 NOTE — CONSULT LETTER
[FreeTextEntry2] : Dr. Flora Rivas (Hem/Onc/Referring) [FreeTextEntry3] : Milton Mcfarlane MD, MPH  System Director of Thoracic Surgery  Director of Comprehensive Lung and Foregut [place]  Professor Cardiovascular & Thoracic Surgery   [facility] at [facility] [phone] [place] [address] [place] [address] Tel: [phone] Fax: [phone]

## 2024-03-21 NOTE — PHYSICAL EXAM
[] : no respiratory distress [Auscultation Breath Sounds / Voice Sounds] : lungs were clear to auscultation bilaterally [Heart Rate And Rhythm] : heart rate was normal and rhythm regular [Chest Visual Inspection Thoracic Asymmetry] : no chest asymmetry [Examination Of The Chest] : the chest was normal in appearance [Diminished Respiratory Excursion] : normal chest expansion

## 2024-03-21 NOTE — DATA REVIEWED
[FreeTextEntry1] : I have independently reviewed the following: CT Chest on 3/7/24 at [facility]

## 2024-03-21 NOTE — ASSESSMENT
[FreeTextEntry1] : Ms. 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 Rico Rose.  Now 3.5 mo s/p a Flex Bronch, Lt VATS R.A. LLL superior segmentectomy, wedge rxn of LLL nodules, MLND on 11/29/23. Path revealed Atypical pneumocyte proliferation (0.9 cm) associated with marked lymphoid hyperplasia most consistent with adenocarcinoma in situ, non-mucinous with prominent lymphoid reaction.  No invasion is seen. Tumor nodule is 0.6 cm from the resection margin. All LNs negative for carcinoma. Immunohistochemistry for TTF-1 highlights the pneumocytes and immunohistochemistry for CD20, CD3, and CD21 highlight lymphoid hyperplasia.  CT Chest on 3/7/24 at [facility]: - post-op changes - a 5 mm solid nodule in the posterior LLL on series 3/image 147 likely corresponds to a previously identified 3 mm nodule on prior series 5/image 150. - a 4 mm calcified granuloma in the lateral left upper lobe on image 98 is unchanged.  - a 3 mm semisolid nodule in the central mid left upper lobe on image 113 was not definitely present on the prior study. - Architectural changes in the right lung appear relatively stable with revisualization of scattered noncalcified nodular foci measuring up to 7 mm in size, previously not discretely hypermetabolic. - No pleural effusion. No pericardial effusion. - Stable hilar contours. Stable to minimal increasing size of nonenlarged lymph nodes in the mediastinum. No mediastinal soft tissue mass.  I have reviewed the patient's medical records and diagnostic images at time of this office consultation and have made the following recommendation: 1. CT chest reviewed with patient, stable findings. Recommended CT chest without contrast in 6 months for surveillance.   I, ROSEMARY Guidry, personally performed the evaluation and management (E/M) services for this established patient who presents today with (a) new problem(s)/exacerbation of (an) existing condition(s).  That E/M includes conducting the examination, assessing all new/exacerbated conditions, and establishing a new plan of care.  Today, my ACP, Yuki Garner/SHAN Munson-BC, was here to observe my evaluation and management services for this new problem/exacerbated condition to be followed going forward.

## 2024-06-19 ENCOUNTER — APPOINTMENT (OUTPATIENT)
Dept: ORTHOPEDIC SURGERY | Facility: CLINIC | Age: 64
End: 2024-06-19
Payer: COMMERCIAL

## 2024-06-19 VITALS — WEIGHT: 218 LBS | HEIGHT: 65 IN | BODY MASS INDEX: 36.32 KG/M2

## 2024-06-19 DIAGNOSIS — M75.42 IMPINGEMENT SYNDROME OF LEFT SHOULDER: ICD-10-CM

## 2024-06-19 PROCEDURE — 73030 X-RAY EXAM OF SHOULDER: CPT | Mod: LT

## 2024-06-19 PROCEDURE — 99204 OFFICE O/P NEW MOD 45 MIN: CPT

## 2024-06-19 RX ORDER — MELOXICAM 15 MG/1
15 TABLET ORAL
Qty: 30 | Refills: 0 | Status: ACTIVE | COMMUNITY
Start: 2024-06-19 | End: 2024-07-19

## 2024-06-19 NOTE — PHYSICAL EXAM
[] : positive impingement testing [Left] : left shoulder [There are no fractures, subluxations or dislocations. No significant abnormalities are seen] : There are no fractures, subluxations or dislocations. No significant abnormalities are seen [TWNoteComboBox6] : internal rotation 25 degrees

## 2024-06-19 NOTE — HISTORY OF PRESENT ILLNESS
[Rest] : rest [Sudden] : sudden [10] : 10 [4] : 4 [Dull/Aching] : dull/aching [Sharp] : sharp [Constant] : constant [Meds] : meds [de-identified] : Patient walking on her own PMHx htn, HLD, Lung CA (11/2023), left hand dominant, complaining of left shoulder pain x 4 months.  Does not recall trauma. symptoms not improving, becoming persistent. Pain indicated to anterior shoulder aspect, 10/10, intermittent, achy in nature. Reports decreased ROM, hearing popping noise.  Worsening leaning on it, lifting to point affecting quality of life.  Some relief with physical therapy, tylenol some relief.  [] : no [FreeTextEntry1] : LT shoulder  [FreeTextEntry5] : Patient states NO INJURY. Pain began in February and has gotten better.  [FreeTextEntry9] : tylenol  [de-identified] : lifting or reaching

## 2024-06-19 NOTE — DISCUSSION/SUMMARY
[de-identified] : modify activities use elastic sleeve for structural support try OTC meds ice as needed try topical lidocaine for pain control reviewed current medications used by this patient home exercises for functional return 06/19/2024    RE:  NAKUL RODRIGUEZ   [id number]    Attention:  Nurse Reviewer /Medical Director  I am writing this letter as a medical necessity for PT program. Patient has tried analgesics, non-steroid anti-inflammatory agents,  hot or cold compresses,injections of corticosteroids, etc)  which in combination or by themselves has not worked. Based on my patient's condition, I strongly believe that the PT is medically needed.   Thank you for your time and consideration.

## 2024-07-24 ENCOUNTER — APPOINTMENT (OUTPATIENT)
Dept: ORTHOPEDIC SURGERY | Facility: CLINIC | Age: 64
End: 2024-07-24
Payer: COMMERCIAL

## 2024-07-24 DIAGNOSIS — M75.42 IMPINGEMENT SYNDROME OF LEFT SHOULDER: ICD-10-CM

## 2024-07-24 PROCEDURE — 99213 OFFICE O/P EST LOW 20 MIN: CPT

## 2024-07-24 NOTE — PHYSICAL EXAM
[Left] : left shoulder [There are no fractures, subluxations or dislocations. No significant abnormalities are seen] : There are no fractures, subluxations or dislocations. No significant abnormalities are seen [] : no swelling [TWNoteComboBox7] : active forward flexion 165 degrees [TWNoteComboBox6] : internal rotation 25 degrees

## 2024-07-24 NOTE — DISCUSSION/SUMMARY
[de-identified] : modify activities use elastic sleeve for structural support try OTC meds ice as needed try topical lidocaine for pain control reviewed current medications used by this patient home exercises for functional return 07/24/2024    RE:  NAKUL RODRIGUEZ   Acct #- [id number]    Attention:  Nurse Reviewer /Medical Director  I am writing this letter as a medical necessity for PT program. Patient has tried analgesics, non-steroid anti-inflammatory agents,  hot or cold compresses,injections of corticosteroids, etc)  which in combination or by themselves has not worked. Based on my patient's condition, I strongly believe that the PT is medically needed.   Thank you for your time and consideration.    poss csi

## 2024-07-24 NOTE — HISTORY OF PRESENT ILLNESS
[Sudden] : sudden [10] : 10 [4] : 4 [Dull/Aching] : dull/aching [Sharp] : sharp [Constant] : constant [Rest] : rest [Meds] : meds [de-identified] : Patient walking on her own PMHx htn, HLD, Lung CA (11/2023), left hand dominant, complaining of left shoulder pain for many months.  Does not recall trauma. symptoms not improving, becoming persistent. Uses tylenol and only been to PT twice, has soreness [] : no [FreeTextEntry1] : LT shoulder  [FreeTextEntry5] : Patient states NO INJURY. Pain began in February and has gotten better.  [FreeTextEntry9] : tylenol  [de-identified] : lifting or reaching

## 2024-07-30 ENCOUNTER — APPOINTMENT (OUTPATIENT)
Dept: ORTHOPEDIC SURGERY | Facility: CLINIC | Age: 64
End: 2024-07-30
Payer: COMMERCIAL

## 2024-07-30 VITALS — WEIGHT: 218 LBS | BODY MASS INDEX: 36.32 KG/M2 | HEIGHT: 65 IN

## 2024-07-30 DIAGNOSIS — M54.16 RADICULOPATHY, LUMBAR REGION: ICD-10-CM

## 2024-07-30 DIAGNOSIS — S23.9XXA SPRAIN OF UNSPECIFIED PARTS OF THORAX, INITIAL ENCOUNTER: ICD-10-CM

## 2024-07-30 DIAGNOSIS — M51.36 OTHER INTERVERTEBRAL DISC DEGENERATION, LUMBAR REGION: ICD-10-CM

## 2024-07-30 PROCEDURE — 72110 X-RAY EXAM L-2 SPINE 4/>VWS: CPT

## 2024-07-30 PROCEDURE — 72170 X-RAY EXAM OF PELVIS: CPT

## 2024-07-30 PROCEDURE — 99204 OFFICE O/P NEW MOD 45 MIN: CPT

## 2024-07-30 PROCEDURE — 72070 X-RAY EXAM THORAC SPINE 2VWS: CPT

## 2024-07-30 PROCEDURE — 99214 OFFICE O/P EST MOD 30 MIN: CPT

## 2024-07-30 RX ORDER — GABAPENTIN 100 MG/1
100 CAPSULE ORAL
Qty: 60 | Refills: 2 | Status: ACTIVE | COMMUNITY
Start: 2024-07-30 | End: 1900-01-01

## 2024-07-30 NOTE — IMAGING
[de-identified] : LSPINE Inspection: No rash or ecchymosis Palpation: No tenderness to palpation or spasm in bilateral thoracic and lumbar paraspinal musculature, no SI joint tenderness to palpation ROM: Full with no pain Strength: 5/5 bilateral hip flexors, knee extensors, ankle dorsiflexors, EHL, ankle plantarflexors Sensation: Sensation present to light touch bilateral L2-S1 distributions Provocative maneuvers: Negative bilateral straight leg raise [Facet arthropathy] : Facet arthropathy [AP] : anteroposterior [There are no fractures, subluxations or dislocations. No significant abnormalities are seen] : There are no fractures, subluxations or dislocations. No significant abnormalities are seen [Disc space narrowing] : Disc space narrowing

## 2024-07-30 NOTE — ASSESSMENT
[FreeTextEntry1] : PT, meds  Will discuss MRI  Gabapentin- Patient advised of sedating effects, instructed not to drive, operate machinery, or take with other sedating medications. Advised of need to taper on/off medication and risk of abruptly stopping gabapentin.

## 2024-07-30 NOTE — HISTORY OF PRESENT ILLNESS
[de-identified] : 7/30/24- NAKUL RODRIGUEZ a 64 year F here for evaluation of her spine, Patient states she has history of back pain for the past several years; pain has worsened over time. Patient reports numbness/tingling down the left leg. NO bb dysfunction.     [] : no

## 2024-09-04 ENCOUNTER — APPOINTMENT (OUTPATIENT)
Dept: ORTHOPEDIC SURGERY | Facility: CLINIC | Age: 64
End: 2024-09-04
Payer: COMMERCIAL

## 2024-09-04 VITALS — HEIGHT: 65 IN | BODY MASS INDEX: 36.32 KG/M2 | WEIGHT: 218 LBS

## 2024-09-04 DIAGNOSIS — M75.42 IMPINGEMENT SYNDROME OF LEFT SHOULDER: ICD-10-CM

## 2024-09-04 PROCEDURE — 99213 OFFICE O/P EST LOW 20 MIN: CPT

## 2024-09-04 NOTE — DISCUSSION/SUMMARY
[de-identified] : modify activities try OTC meds ice as needed try topical lidocaine for pain control reviewed current medications used by this patient home exercises for functional return 09/4/2024    RE:  NAKUL RODRIGUEZ   Acct #- [id number]    Attention:  Nurse Reviewer /Medical Director  I am writing this letter as a medical necessity for PT program. Patient has tried analgesics, non-steroid anti-inflammatory agents,  hot or cold compresses,injections of corticosteroids, etc)  which in combination or by themselves has not worked. Based on my patient's condition, I strongly believe that the PT is medically needed.   Thank you for your time and consideration.    ramin csi

## 2024-09-04 NOTE — PHYSICAL EXAM
[Left] : left shoulder [] : negative impingement testing [FreeTextEntry8] : sl [TWNoteComboBox7] : False [TWNoteComboBox6] : False

## 2024-09-04 NOTE — HISTORY OF PRESENT ILLNESS
[Sudden] : sudden [4] : 4 [Dull/Aching] : dull/aching [Sharp] : sharp [Constant] : constant [Rest] : rest [Meds] : meds [Physical therapy] : physical therapy [de-identified] : happy with PT, improved motion, uses meds on occasion, shoulder aches [10] : 10 [] : no [FreeTextEntry1] : LT shoulder  [FreeTextEntry5] : Patient states NO INJURY. Pain began in February and has gotten better.  [FreeTextEntry9] : tylenol  [de-identified] : lifting or reaching  [de-identified] : PT

## 2024-09-18 NOTE — HISTORY OF PRESENT ILLNESS
[FreeTextEntry1] : Ms. 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 Rico Rose.  CT Chest on 10/23/23 at [facility]: - **an increasing size 1.2 cm semisolid nodule in the LLL (5: 124; previously 0.8 cm). The solid component measures 0.7 cm in diameter, also enlarged. - a 0.3 cm right apical posterior nodule without change (5: 34) - a 0.4 cm RUL medial nodule without change (5: 72) - a 0.3 cm calcified granuloma in the TERESITA (5: 97). - a 0.2 cm nodule along the right major fissure without change (5: 103). - a 0.3 cm nodule in the RLL posteriorly without change (5: 103). - a 0.4 cm subpleural nodule in the RLL posteriorly without change (5: 112). - a 0.2 cm nodule along the left major fissure without change (5: 116). - a 0.4 cm nodule along the right major fissure without change (5: 123). - a 0.8 cm groundglass nodule in the RLL without change (5: 129). - a 0.5 cm nodule in the LLL posteriorly without change (5: 149). - a 0.4 cm nodule in the RLL laterally without change (5: 163). - a 0.4 cm RLL nodule without change (5: 170). - a 0.7 cm nodule in the medial basal segment of the RLL without change (5: 172). - a 0.6 cm subpleural nodule in the right lung base without change (5: 192). - **a 1.1 cm oval nodule at the left base without change (5: 199). There is an adjacent 0.5 cm subpleural nodule without change. - a 0.9 cm nodule in the medial basal segment of the RLL without change (5: 220). - There is no change in discoid atelectasis in the lingula. - There is a small area of focal bronchiectasis in the medial basal segment of the right lower lobe without change.  PET/CT on 11/14/23 at [facility]: - low-grade uptake associated with the enlarging mixed density 1.1 cm nodule in the posterior left lower lobe on series 11/image 94 (maximum SUV 1.7), obscured by respiratory and not obviously changed since 10/2023 but definitely increased since 9/2022.  - By way of comparison, uptake associated with the largest of a stable appearing noncalcified nodules, including the focus in the anterior lingula/lateral left upper lobe on image 109 are associated with similar grade uptake (maximum SUV 2.0).  - no abnormally enlarged or hypermetabolic lymph nodes in the mediastinum or in the rest of the chest. - benign-appearing hepatorenal cystic foci. - cholelithiasis without CT evidence of cholecystitis.  PFTs on 11/21/23: %, FEV1 117%, DLCO 93%.  Now 3.5 mo s/p a Flex Bronch, Lt VATS R.A. LLL superior segmentectomy, wedge rxn of LLL nodules, MLND on 11/29/23. Path revealed Atypical pneumocyte proliferation (0.9 cm) associated with marked lymphoid hyperplasia most consistent with adenocarcinoma in situ, non-mucinous with prominent lymphoid reaction.  No invasion is seen. Tumor nodule is 0.6 cm from the resection margin. All LNs negative for carcinoma. Immunohistochemistry for TTF-1 highlights the pneumocytes and immunohistochemistry for CD20, CD3, and CD21 highlight lymphoid hyperplasia.  CT Chest on 3/7/24 at [facility]: - post-op changes - a 5 mm solid nodule in the posterior LLL on series 3/image 147 likely corresponds to a previously identified 3 mm nodule on prior series 5/image 150. - a 4 mm calcified granuloma in the lateral left upper lobe on image 98 is unchanged.  - a 3 mm semisolid nodule in the central mid left upper lobe on image 113 was not definitely present on the prior study. - Architectural changes in the right lung appear relatively stable with revisualization of scattered noncalcified nodular foci measuring up to 7 mm in size, previously not discretely hypermetabolic. - No pleural effusion. No pericardial effusion. - Stable hilar contours. Stable to minimal increasing size of nonenlarged lymph nodes in the mediastinum. No mediastinal soft tissue mass.  CT Chest on 9/18/24 ([facility]): -   Patient presents today for 6 months follow up.

## 2024-09-18 NOTE — ASSESSMENT
[FreeTextEntry1] : Ms. 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 Rico Rose.  Now 3.5 mo s/p a Flex Bronch, Lt VATS R.A. LLL superior segmentectomy, wedge rxn of LLL nodules, MLND on 11/29/23. Path revealed Atypical pneumocyte proliferation (0.9 cm) associated with marked lymphoid hyperplasia most consistent with adenocarcinoma in situ, non-mucinous with prominent lymphoid reaction.  No invasion is seen. Tumor nodule is 0.6 cm from the resection margin. All LNs negative for carcinoma. Immunohistochemistry for TTF-1 highlights the pneumocytes and immunohistochemistry for CD20, CD3, and CD21 highlight lymphoid hyperplasia.  Presents today for follow up with imaging.   I have reviewed the patient's medical records and diagnostic images at time of this office consultation and have made the following recommendation: 1.   Recommendations reviewed with patient during this office visit, and all questions answered; Patient instructed on the importance of follow up and verbalizes understanding.

## 2024-09-18 NOTE — CONSULT LETTER
[Dear  ___] : Dear  [unfilled], [Consult Letter:] : I had the pleasure of evaluating your patient, [unfilled]. [( Thank you for referring [unfilled] for consultation for _____ )] : Thank you for referring [unfilled] for consultation for [unfilled] [Please see my note below.] : Please see my note below. [Consult Closing:] : Thank you very much for allowing me to participate in the care of this patient.  If you have any questions, please do not hesitate to contact me. [Sincerely,] : Sincerely, [FreeTextEntry2] : Dr. Flora Rivas (Hem/Onc/Referring) [FreeTextEntry3] : Milton Mcfarlane MD, MPH  System Director of Thoracic Surgery  Director of Comprehensive Lung and Foregut [place]  Professor Cardiovascular & Thoracic Surgery   [facility] [employer] at [facility] [phone] [place] [address] [place] [address] Tel: [phone] Fax: [phone]

## 2024-09-26 ENCOUNTER — NON-APPOINTMENT (OUTPATIENT)
Age: 64
End: 2024-09-26

## 2024-09-27 ENCOUNTER — APPOINTMENT (OUTPATIENT)
Dept: THORACIC SURGERY | Facility: CLINIC | Age: 64
End: 2024-09-27
Payer: COMMERCIAL

## 2024-09-27 DIAGNOSIS — R91.8 OTHER NONSPECIFIC ABNORMAL FINDING OF LUNG FIELD: ICD-10-CM

## 2024-09-27 DIAGNOSIS — D09.9 CARCINOMA IN SITU, UNSPECIFIED: ICD-10-CM

## 2024-09-27 PROCEDURE — 99443: CPT

## 2024-09-27 NOTE — CONSULT LETTER
[FreeTextEntry2] : Dr. Flora Rivas (Hem/Onc/Referring) [FreeTextEntry3] : Milton Mcfarlane MD, MPH  System Director of Thoracic Surgery  Director of Comprehensive Lung and Foregut [place]  Professor Cardiovascular & Thoracic Surgery   [facility] [employer] [facility] at [facility] [phone] [place] [address] [place] [address] Tel: [phone] Fax: [phone]

## 2024-09-27 NOTE — HISTORY OF PRESENT ILLNESS
[FreeTextEntry1] : Ms. 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 Rico Rose.  CT Chest on 10/23/23 at [facility]: - **an increasing size 1.2 cm semisolid nodule in the LLL (5: 124; previously 0.8 cm). The solid component measures 0.7 cm in diameter, also enlarged. - a 0.3 cm right apical posterior nodule without change (5: 34) - a 0.4 cm RUL medial nodule without change (5: 72) - a 0.3 cm calcified granuloma in the TERESITA (5: 97). - a 0.2 cm nodule along the right major fissure without change (5: 103). - a 0.3 cm nodule in the RLL posteriorly without change (5: 103). - a 0.4 cm subpleural nodule in the RLL posteriorly without change (5: 112). - a 0.2 cm nodule along the left major fissure without change (5: 116). - a 0.4 cm nodule along the right major fissure without change (5: 123). - a 0.8 cm groundglass nodule in the RLL without change (5: 129). - a 0.5 cm nodule in the LLL posteriorly without change (5: 149). - a 0.4 cm nodule in the RLL laterally without change (5: 163). - a 0.4 cm RLL nodule without change (5: 170). - a 0.7 cm nodule in the medial basal segment of the RLL without change (5: 172). - a 0.6 cm subpleural nodule in the right lung base without change (5: 192). - **a 1.1 cm oval nodule at the left base without change (5: 199). There is an adjacent 0.5 cm subpleural nodule without change. - a 0.9 cm nodule in the medial basal segment of the RLL without change (5: 220). - There is no change in discoid atelectasis in the lingula. - There is a small area of focal bronchiectasis in the medial basal segment of the right lower lobe without change.  PET/CT on 11/14/23 at [facility]: - low-grade uptake associated with the enlarging mixed density 1.1 cm nodule in the posterior left lower lobe on series 11/image 94 (maximum SUV 1.7), obscured by respiratory and not obviously changed since 10/2023 but definitely increased since 9/2022.  - By way of comparison, uptake associated with the largest of a stable appearing noncalcified nodules, including the focus in the anterior lingula/lateral left upper lobe on image 109 are associated with similar grade uptake (maximum SUV 2.0).  - no abnormally enlarged or hypermetabolic lymph nodes in the mediastinum or in the rest of the chest. - benign-appearing hepatorenal cystic foci. - cholelithiasis without CT evidence of cholecystitis.  PFTs on 11/21/23: %, FEV1 117%, DLCO 93%.  Now 3.5 mo s/p a Flex Bronch, Lt VATS R.A. LLL superior segmentectomy, wedge rxn of LLL nodules, MLND on 11/29/23. Path revealed Atypical pneumocyte proliferation (0.9 cm) associated with marked lymphoid hyperplasia most consistent with adenocarcinoma in situ, non-mucinous with prominent lymphoid reaction.  No invasion is seen. Tumor nodule is 0.6 cm from the resection margin. All LNs negative for carcinoma. Immunohistochemistry for TTF-1 highlights the pneumocytes and immunohistochemistry for CD20, CD3, and CD21 highlight lymphoid hyperplasia.  CT Chest on 9/18/24 ([facility]): - Post subtotal left lower lobectomy (superior segment) related architectural distortion, scarring/fibrosis and associated volume loss remain unchanged.  - Areas of linear scarring or subsegmental atelectasis remain in the lateral left upper lobe and lingula.  - Tiny focal consolidation in the medial left upper lobe plastered along the mediastinal pleura (image 70), ill-defined focal consolidation-confluent reticular scarring in the central basal right lower lobe image 193) are also stable.  - A few other areas of minor scarring-atelectasis are also noted all of which are not delineated herein for brevity.  - Stable nodules include: 5 mm posterior basal left lower lobe (image 139). 3 mm central left upper lobe and 3 mm lateral left upper lobe calcified granuloma (both image 89). 3 mm semisolid posterior left upper lobe (image 104). 3 mm posterior medial subpleural aspect superior segment right lower lobe (image 115). 4 mm subpleural medial segment right middle lobe (image 150). 3 mm groundglass peripheral aspect anterobasal right lower lobe (image 168). 6 mm irregular medial basal right lower lobe (image 176). 4 mm posterior basal right lower lobe (image 176). 3 mm subpleural posterior lateral right lower lobe (image 201).  Pt today to follow up with telephonic. Pt reports doing well, denies SOB, cough or CP.

## 2024-09-27 NOTE — HISTORY OF PRESENT ILLNESS
[FreeTextEntry1] : Ms. 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 Rico Rose.  CT Chest on 10/23/23 at [facility]: - **an increasing size 1.2 cm semisolid nodule in the LLL (5: 124; previously 0.8 cm). The solid component measures 0.7 cm in diameter, also enlarged. - a 0.3 cm right apical posterior nodule without change (5: 34) - a 0.4 cm RUL medial nodule without change (5: 72) - a 0.3 cm calcified granuloma in the TERESITA (5: 97). - a 0.2 cm nodule along the right major fissure without change (5: 103). - a 0.3 cm nodule in the RLL posteriorly without change (5: 103). - a 0.4 cm subpleural nodule in the RLL posteriorly without change (5: 112). - a 0.2 cm nodule along the left major fissure without change (5: 116). - a 0.4 cm nodule along the right major fissure without change (5: 123). - a 0.8 cm groundglass nodule in the RLL without change (5: 129). - a 0.5 cm nodule in the LLL posteriorly without change (5: 149). - a 0.4 cm nodule in the RLL laterally without change (5: 163). - a 0.4 cm RLL nodule without change (5: 170). - a 0.7 cm nodule in the medial basal segment of the RLL without change (5: 172). - a 0.6 cm subpleural nodule in the right lung base without change (5: 192). - **a 1.1 cm oval nodule at the left base without change (5: 199). There is an adjacent 0.5 cm subpleural nodule without change. - a 0.9 cm nodule in the medial basal segment of the RLL without change (5: 220). - There is no change in discoid atelectasis in the lingula. - There is a small area of focal bronchiectasis in the medial basal segment of the right lower lobe without change.  PET/CT on 11/14/23 at [facility]: - low-grade uptake associated with the enlarging mixed density 1.1 cm nodule in the posterior left lower lobe on series 11/image 94 (maximum SUV 1.7), obscured by respiratory and not obviously changed since 10/2023 but definitely increased since 9/2022.  - By way of comparison, uptake associated with the largest of a stable appearing noncalcified nodules, including the focus in the anterior lingula/lateral left upper lobe on image 109 are associated with similar grade uptake (maximum SUV 2.0).  - no abnormally enlarged or hypermetabolic lymph nodes in the mediastinum or in the rest of the chest. - benign-appearing hepatorenal cystic foci. - cholelithiasis without CT evidence of cholecystitis.  PFTs on 11/21/23: %, FEV1 117%, DLCO 93%.  Now 3.5 mo s/p a Flex Bronch, Lt VATS R.A. LLL superior segmentectomy, wedge rxn of LLL nodules, MLND on 11/29/23. Path revealed Atypical pneumocyte proliferation (0.9 cm) associated with marked lymphoid hyperplasia most consistent with adenocarcinoma in situ, non-mucinous with prominent lymphoid reaction.  No invasion is seen. Tumor nodule is 0.6 cm from the resection margin. All LNs negative for carcinoma. Immunohistochemistry for TTF-1 highlights the pneumocytes and immunohistochemistry for CD20, CD3, and CD21 highlight lymphoid hyperplasia.  CT Chest on 9/18/24 ([facility]): - Post subtotal left lower lobectomy (superior segment) related architectural distortion, scarring/fibrosis and associated volume loss remain unchanged.  - Areas of linear scarring or subsegmental atelectasis remain in the lateral left upper lobe and lingula.  - Tiny focal consolidation in the medial left upper lobe plastered along the mediastinal pleura (image 70), ill-defined focal consolidation-confluent reticular scarring in the central basal right lower lobe image 193) are also stable.  - A few other areas of minor scarring-atelectasis are also noted all of which are not delineated herein for brevity.  - Stable nodules include: 5 mm posterior basal left lower lobe (image 139). 3 mm central left upper lobe and 3 mm lateral left upper lobe calcified granuloma (both image 89). 3 mm semisolid posterior left upper lobe (image 104). 3 mm posterior medial subpleural aspect superior segment right lower lobe (image 115). 4 mm subpleural medial segment right middle lobe (image 150). 3 mm groundglass peripheral aspect anterobasal right lower lobe (image 168). 6 mm irregular medial basal right lower lobe (image 176). 4 mm posterior basal right lower lobe (image 176). 3 mm subpleural posterior lateral right lower lobe (image 201).  Pt today to follow up with telephonic. Pt reports doing well, denies SOB, cough or CP.

## 2024-09-27 NOTE — REASON FOR VISIT
[Home] : at home, [unfilled] , at the time of the visit. [Medical Office: ([facility])___] : at the medical office located in  [Verbal consent obtained from patient] : the patient, [unfilled]

## 2024-09-27 NOTE — ASSESSMENT
[FreeTextEntry1] : Ms. 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 Rico Rose.  Now 3.5 mo s/p a Flex Bronch, Lt VATS R.A. LLL superior segmentectomy, wedge rxn of LLL nodules, MLND on 11/29/23. Path revealed Atypical pneumocyte proliferation (0.9 cm) associated with marked lymphoid hyperplasia most consistent with adenocarcinoma in situ, non-mucinous with prominent lymphoid reaction.  No invasion is seen. Tumor nodule is 0.6 cm from the resection margin. All LNs negative for carcinoma. Immunohistochemistry for TTF-1 highlights the pneumocytes and immunohistochemistry for CD20, CD3, and CD21 highlight lymphoid hyperplasia.  I have reviewed the patient's medical records and diagnostic images at time of this office consultation and have made the following recommendation: 1. CT reviewed by Dr. Milton Mcfarlane, stable lung nodules, continue surveillance. RTC in 6 mons with CT Chest w/o contrast

## 2024-09-27 NOTE — CONSULT LETTER
[FreeTextEntry2] : Dr. Flora Rivas (Hem/Onc/Referring) [FreeTextEntry3] : Milton Mcfarlane MD, MPH  System Director of Thoracic Surgery  Director of Comprehensive Lung and Foregut [place]  Professor Cardiovascular & Thoracic Surgery   [facility] at [facility] [phone] [place] [address] [place] [address] Tel: [phone] Fax: [phone]

## 2024-09-27 NOTE — CONSULT LETTER
[FreeTextEntry2] : Dr. Flora Rivas (Hem/Onc/Referring) [FreeTextEntry3] : Milton Mcfarlane MD, MPH  System Director of Thoracic Surgery  Director of Comprehensive Lung and Foregut [place]  Professor Cardiovascular & Thoracic Surgery   [facility] at [facility] [phone] [place] [address] [place] [address] Tel: [phone] Fax: [phone]

## 2024-09-27 NOTE — REASON FOR VISIT
[Home] : at home, [unfilled] , at the time of the visit. [Medical Office: ([facility])___] : at the medical office located in  [Verbal consent obtained from patient] : the patient, [unfilled]

## 2024-09-27 NOTE — HISTORY OF PRESENT ILLNESS
[FreeTextEntry1] : Ms. 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 Rico Rose.  CT Chest on 10/23/23 at [facility]: - **an increasing size 1.2 cm semisolid nodule in the LLL (5: 124; previously 0.8 cm). The solid component measures 0.7 cm in diameter, also enlarged. - a 0.3 cm right apical posterior nodule without change (5: 34) - a 0.4 cm RUL medial nodule without change (5: 72) - a 0.3 cm calcified granuloma in the TERESITA (5: 97). - a 0.2 cm nodule along the right major fissure without change (5: 103). - a 0.3 cm nodule in the RLL posteriorly without change (5: 103). - a 0.4 cm subpleural nodule in the RLL posteriorly without change (5: 112). - a 0.2 cm nodule along the left major fissure without change (5: 116). - a 0.4 cm nodule along the right major fissure without change (5: 123). - a 0.8 cm groundglass nodule in the RLL without change (5: 129). - a 0.5 cm nodule in the LLL posteriorly without change (5: 149). - a 0.4 cm nodule in the RLL laterally without change (5: 163). - a 0.4 cm RLL nodule without change (5: 170). - a 0.7 cm nodule in the medial basal segment of the RLL without change (5: 172). - a 0.6 cm subpleural nodule in the right lung base without change (5: 192). - **a 1.1 cm oval nodule at the left base without change (5: 199). There is an adjacent 0.5 cm subpleural nodule without change. - a 0.9 cm nodule in the medial basal segment of the RLL without change (5: 220). - There is no change in discoid atelectasis in the lingula. - There is a small area of focal bronchiectasis in the medial basal segment of the right lower lobe without change.  PET/CT on 11/14/23 at [facility]: - low-grade uptake associated with the enlarging mixed density 1.1 cm nodule in the posterior left lower lobe on series 11/image 94 (maximum SUV 1.7), obscured by respiratory and not obviously changed since 10/2023 but definitely increased since 9/2022.  - By way of comparison, uptake associated with the largest of a stable appearing noncalcified nodules, including the focus in the anterior lingula/lateral left upper lobe on image 109 are associated with similar grade uptake (maximum SUV 2.0).  - no abnormally enlarged or hypermetabolic lymph nodes in the mediastinum or in the rest of the chest. - benign-appearing hepatorenal cystic foci. - cholelithiasis without CT evidence of cholecystitis.  PFTs on 11/21/23: %, FEV1 117%, DLCO 93%.  Now 3.5 mo s/p a Flex Bronch, Lt VATS R.A. LLL superior segmentectomy, wedge rxn of LLL nodules, MLND on 11/29/23. Path revealed Atypical pneumocyte proliferation (0.9 cm) associated with marked lymphoid hyperplasia most consistent with adenocarcinoma in situ, non-mucinous with prominent lymphoid reaction.  No invasion is seen. Tumor nodule is 0.6 cm from the resection margin. All LNs negative for carcinoma. Immunohistochemistry for TTF-1 highlights the pneumocytes and immunohistochemistry for CD20, CD3, and CD21 highlight lymphoid hyperplasia.  CT Chest on 9/18/24 ([facility]): - Post subtotal left lower lobectomy (superior segment) related architectural distortion, scarring/fibrosis and associated volume loss remain unchanged.  - Areas of linear scarring or subsegmental atelectasis remain in the lateral left upper lobe and lingula.  - Tiny focal consolidation in the medial left upper lobe plastered along the mediastinal pleura (image 70), ill-defined focal consolidation-confluent reticular scarring in the central basal right lower lobe image 193) are also stable.  - A few other areas of minor scarring-atelectasis are also noted all of which are not delineated herein for brevity.  - Stable nodules include: 5 mm posterior basal left lower lobe (image 139). 3 mm central left upper lobe and 3 mm lateral left upper lobe calcified granuloma (both image 89). 3 mm semisolid posterior left upper lobe (image 104). 3 mm posterior medial subpleural aspect superior segment right lower lobe (image 115). 4 mm subpleural medial segment right middle lobe (image 150). 3 mm groundglass peripheral aspect anterobasal right lower lobe (image 168). 6 mm irregular medial basal right lower lobe (image 176). 4 mm posterior basal right lower lobe (image 176). 3 mm subpleural posterior lateral right lower lobe (image 201).  Pt today to follow up with telephonic. Pt reports doing well, denies SOB, cough or CP.

## 2024-10-01 ENCOUNTER — APPOINTMENT (OUTPATIENT)
Dept: ORTHOPEDIC SURGERY | Facility: CLINIC | Age: 64
End: 2024-10-01
Payer: COMMERCIAL

## 2024-10-01 DIAGNOSIS — S23.9XXA SPRAIN OF UNSPECIFIED PARTS OF THORAX, INITIAL ENCOUNTER: ICD-10-CM

## 2024-10-01 DIAGNOSIS — M54.16 RADICULOPATHY, LUMBAR REGION: ICD-10-CM

## 2024-10-01 PROCEDURE — 99213 OFFICE O/P EST LOW 20 MIN: CPT

## 2024-10-01 NOTE — IMAGING
[Facet arthropathy] : Facet arthropathy [AP] : anteroposterior [There are no fractures, subluxations or dislocations. No significant abnormalities are seen] : There are no fractures, subluxations or dislocations. No significant abnormalities are seen [Disc space narrowing] : Disc space narrowing [de-identified] : LSPINE Palpation: No tenderness to palpation or spasm in bilateral thoracic and lumbar paraspinal musculature, no SI joint tenderness to palpation ROM: Full with stiffness Strength: 5/5 bilateral hip flexors, knee extensors, ankle dorsiflexors, EHL, ankle plantarflexors Sensation: Sensation present to light touch bilateral L2-S1 distributions Provocative maneuvers: Negative bilateral straight leg raise

## 2024-10-01 NOTE — HISTORY OF PRESENT ILLNESS
[de-identified] : 7/30/24- NAKUL RODRIGUEZ a 64 year F here for evaluation of her spine, Patient states she has history of back pain for the past several years; pain has worsened over time. Patient reports numbness/tingling down the left leg. NO bb dysfunction.  10/1/24- continued back pain, tried PT with no relief. Was focusing on her shoulders moreso in PT.    [] : no

## 2024-12-03 ENCOUNTER — APPOINTMENT (OUTPATIENT)
Dept: ORTHOPEDIC SURGERY | Facility: CLINIC | Age: 64
End: 2024-12-03

## 2025-03-04 NOTE — PHYSICAL THERAPY INITIAL EVALUATION ADULT - PRECAUTIONS/LIMITATIONS, REHAB EVAL
Notify patient PSA has come down to 5.27 most recently.We will discuss at next appointment.
Notify patient urinalysis appears negative for blood or infection.
cardiac precautions/fall precautions/surgical precautions

## 2025-03-28 ENCOUNTER — NON-APPOINTMENT (OUTPATIENT)
Age: 65
End: 2025-03-28

## 2025-04-03 ENCOUNTER — APPOINTMENT (OUTPATIENT)
Dept: THORACIC SURGERY | Facility: CLINIC | Age: 65
End: 2025-04-03
Payer: COMMERCIAL

## 2025-04-03 ENCOUNTER — NON-APPOINTMENT (OUTPATIENT)
Age: 65
End: 2025-04-03

## 2025-04-03 DIAGNOSIS — R91.8 OTHER NONSPECIFIC ABNORMAL FINDING OF LUNG FIELD: ICD-10-CM

## 2025-04-03 DIAGNOSIS — D09.9 CARCINOMA IN SITU, UNSPECIFIED: ICD-10-CM

## 2025-04-03 PROCEDURE — 99213 OFFICE O/P EST LOW 20 MIN: CPT | Mod: 93

## (undated) DEVICE — GRASPER LAPA 5MMX35CM

## (undated) DEVICE — XI DRAPE ARM

## (undated) DEVICE — DRSG GAUZE PACKTNER ROLL

## (undated) DEVICE — SYR LUER LOK 20CC

## (undated) DEVICE — CHEST DRAIN PLEUR-EVAC DRY/WET ADULT-PEDS SINGLE (QUICK)

## (undated) DEVICE — GOWN XL

## (undated) DEVICE — DRSG CURITY GAUZE SPONGE 4 X 4" 12-PLY

## (undated) DEVICE — SUT VICRYL 4-0 27" PS-2 UNDYED

## (undated) DEVICE — TUBING OLYMPUS INSUFFLATION

## (undated) DEVICE — TROCAR COVIDIEN VERSASTEP PLUS 15MM STANDARD

## (undated) DEVICE — ELCTR BOVIE TIP BLADE INSULATED 2.75" EDGE

## (undated) DEVICE — SUT PROLENE 0 30" CT-1

## (undated) DEVICE — XI ARM CLIP APPLIER MEDIUM-LARGE

## (undated) DEVICE — SOL IRR POUR NS 0.9% 500ML

## (undated) DEVICE — SUT VICRYL 0 27" UR-6

## (undated) DEVICE — MARKING PEN W RULER

## (undated) DEVICE — XI ARM FORCEP FENESTRATED BIPOLAR 8MM

## (undated) DEVICE — DRSG MASTISOL

## (undated) DEVICE — XI DRAPE COLUMN

## (undated) DEVICE — ENDOCATCH GENERAL 15MM (PURPLE)

## (undated) DEVICE — SUT SILK 2-0 30" SH

## (undated) DEVICE — DRSG TEGADERM 4X4.75"

## (undated) DEVICE — ENDOCATCH GENERAL 10MM (PURPLE)

## (undated) DEVICE — NDL HYPO REGULAR BEVEL 22G X 1.5" (TURQUOISE)

## (undated) DEVICE — PACK ROBOTIC LIJ

## (undated) DEVICE — BLADE SURGICAL #10 STAINLESS

## (undated) DEVICE — ELCTR BOVIE TIP BLADE INSULATED 6.5" EDGE

## (undated) DEVICE — TROCAR COVIDIEN VERSAONE BLADELESS FIXATION 12MM STANDARD

## (undated) DEVICE — TUBING SUCTION 20FT

## (undated) DEVICE — XI OBTURATOR OPTICAL BLADELESS 8MM

## (undated) DEVICE — ELCTR BOVIE PENCIL SMOKE EVACUATION

## (undated) DEVICE — D HELP - CLEARVIEW CLEARIFY SYSTEM

## (undated) DEVICE — STERIS DEFENDO 3-PIECE KIT (AIR/WATER, SUCTION & BIOPSY VALVES)

## (undated) DEVICE — TUBING STRYKEFLOW II SUCTION / IRRIGATOR

## (undated) DEVICE — XI SEAL UNIVERSIAL 5-12MM

## (undated) DEVICE — VENODYNE/SCD SLEEVE CALF MEDIUM